# Patient Record
Sex: FEMALE | Race: WHITE | NOT HISPANIC OR LATINO | Employment: OTHER | URBAN - METROPOLITAN AREA
[De-identification: names, ages, dates, MRNs, and addresses within clinical notes are randomized per-mention and may not be internally consistent; named-entity substitution may affect disease eponyms.]

---

## 2017-01-31 ENCOUNTER — TRANSFERRED RECORDS (OUTPATIENT)
Dept: HEALTH INFORMATION MANAGEMENT | Facility: CLINIC | Age: 57
End: 2017-01-31

## 2017-02-03 ENCOUNTER — TRANSFERRED RECORDS (OUTPATIENT)
Dept: HEALTH INFORMATION MANAGEMENT | Facility: CLINIC | Age: 57
End: 2017-02-03

## 2017-05-02 ENCOUNTER — TRANSFERRED RECORDS (OUTPATIENT)
Dept: HEALTH INFORMATION MANAGEMENT | Facility: CLINIC | Age: 57
End: 2017-05-02

## 2017-06-03 ENCOUNTER — HEALTH MAINTENANCE LETTER (OUTPATIENT)
Age: 57
End: 2017-06-03

## 2017-07-19 ENCOUNTER — TRANSFERRED RECORDS (OUTPATIENT)
Dept: HEALTH INFORMATION MANAGEMENT | Facility: CLINIC | Age: 57
End: 2017-07-19

## 2018-01-02 ENCOUNTER — TRANSFERRED RECORDS (OUTPATIENT)
Dept: HEALTH INFORMATION MANAGEMENT | Facility: CLINIC | Age: 58
End: 2018-01-02

## 2018-03-28 ENCOUNTER — TRANSFERRED RECORDS (OUTPATIENT)
Dept: HEALTH INFORMATION MANAGEMENT | Facility: CLINIC | Age: 58
End: 2018-03-28

## 2018-06-20 ENCOUNTER — TRANSFERRED RECORDS (OUTPATIENT)
Dept: HEALTH INFORMATION MANAGEMENT | Facility: CLINIC | Age: 58
End: 2018-06-20

## 2018-09-05 ENCOUNTER — TRANSFERRED RECORDS (OUTPATIENT)
Dept: HEALTH INFORMATION MANAGEMENT | Facility: CLINIC | Age: 58
End: 2018-09-05

## 2019-02-13 ENCOUNTER — TRANSFERRED RECORDS (OUTPATIENT)
Dept: HEALTH INFORMATION MANAGEMENT | Facility: CLINIC | Age: 59
End: 2019-02-13

## 2019-09-29 ENCOUNTER — HEALTH MAINTENANCE LETTER (OUTPATIENT)
Age: 59
End: 2019-09-29

## 2021-01-14 ENCOUNTER — HEALTH MAINTENANCE LETTER (OUTPATIENT)
Age: 61
End: 2021-01-14

## 2021-05-03 ENCOUNTER — TRANSFERRED RECORDS (OUTPATIENT)
Dept: HEALTH INFORMATION MANAGEMENT | Facility: CLINIC | Age: 61
End: 2021-05-03

## 2021-05-11 ENCOUNTER — TRANSFERRED RECORDS (OUTPATIENT)
Dept: HEALTH INFORMATION MANAGEMENT | Facility: CLINIC | Age: 61
End: 2021-05-11

## 2021-08-19 ENCOUNTER — TRANSFERRED RECORDS (OUTPATIENT)
Dept: HEALTH INFORMATION MANAGEMENT | Facility: CLINIC | Age: 61
End: 2021-08-19

## 2021-08-27 ENCOUNTER — TRANSCRIBE ORDERS (OUTPATIENT)
Dept: OTHER | Age: 61
End: 2021-08-27

## 2021-08-27 DIAGNOSIS — C50.919 BREAST CANCER (H): Primary | ICD-10-CM

## 2021-09-12 DIAGNOSIS — Z80.3 FAMILY HISTORY OF BREAST CANCER: Primary | ICD-10-CM

## 2021-10-07 NOTE — PROGRESS NOTES
RECORDS STATUS - BREAST    RECORDS REQUESTED FROM: [facility]/ [facility]    DATE REQUESTED: 11/17/2021   NOTES DETAILS STATUS   OFFICE NOTE from referring provider     OFFICE NOTE from medical oncologist Complete  [facility] Recs are in [facility]    Oncology Visit- 7/7/2016- Malignant neoplasm of left breast (H)     2/5/2014 Breast cancer screening, high risk patient     OFFICE NOTE from surgeon Complete See Breast Biopsies in [facility]   OFFICE NOTE from radiation oncologist     DISCHARGE SUMMARY from hospital Complete 3/30/2016 Left Breast Mass   DISCHARGE REPORT from the ER     OPERATIVE REPORT Complete See Breast Biopsies in EPIC   MEDICATION LIST Complete Epic/CE   CLINICAL TRIAL TREATMENTS TO DATE     LABS     REQUEST BLOCKS FOR ALL BREAST CANCER PTS     PATHOLOGY REPORTS  (Tissue diagnosis, Stage, ER/CT percentage positive and intensity of staining, HER2 IHC, FISH, and all biopsies from breast and any distant metastasis)                 Reports are in Epic/CE Breast Biopsies were done at [facility] Reports are in Epic/CE    6/9/2016    5/3/2016     GENONOMIC TESTING     TYPE:   (Next Generation Sequencing, including Foundation One testing, and Oncotype score)     IMAGING (NEED IMAGES & REPORT)     CT SCANS     MRI     MAMMO Complete CE 3/30/2016   ULTRASOUND Complete 3/23/2016    PET     BONE SCAN     BRAIN MRI       Action    Action Taken 10/7/2021 4:56pm NORRIS     I faxed a request for ALL records to [facility].     I called pt Jazmin - her phone went to .     10/11/2021 7:33AM NORRIS   I faxed records from [facility] to [facility]

## 2021-10-24 ENCOUNTER — HEALTH MAINTENANCE LETTER (OUTPATIENT)
Age: 61
End: 2021-10-24

## 2021-11-09 ENCOUNTER — LAB (OUTPATIENT)
Dept: LAB | Facility: CLINIC | Age: 61
End: 2021-11-09
Payer: COMMERCIAL

## 2021-11-09 DIAGNOSIS — Z00.6 EXAMINATION OF PARTICIPANT IN CLINICAL TRIAL: Primary | ICD-10-CM

## 2021-11-09 DIAGNOSIS — Z80.3 FAMILY HISTORY OF BREAST CANCER: ICD-10-CM

## 2021-11-09 LAB
ALBUMIN SERPL-MCNC: 3.5 G/DL (ref 3.4–5)
ALP SERPL-CCNC: 121 U/L (ref 40–150)
ALT SERPL W P-5'-P-CCNC: 32 U/L (ref 0–50)
ANION GAP SERPL CALCULATED.3IONS-SCNC: 3 MMOL/L (ref 3–14)
AST SERPL W P-5'-P-CCNC: 14 U/L (ref 0–45)
BASOPHILS # BLD AUTO: 0.1 10E3/UL (ref 0–0.2)
BASOPHILS NFR BLD AUTO: 1 %
BILIRUB SERPL-MCNC: 0.9 MG/DL (ref 0.2–1.3)
BUN SERPL-MCNC: 14 MG/DL (ref 7–30)
CALCIUM SERPL-MCNC: 9.2 MG/DL (ref 8.5–10.1)
CANCER AG27-29 SERPL-ACNC: 32 U/ML (ref 0–39)
CEA SERPL-MCNC: 1.1 UG/L (ref 0–2.5)
CHLORIDE BLD-SCNC: 106 MMOL/L (ref 94–109)
CO2 SERPL-SCNC: 33 MMOL/L (ref 20–32)
CREAT SERPL-MCNC: 0.87 MG/DL (ref 0.52–1.04)
EOSINOPHIL # BLD AUTO: 0.3 10E3/UL (ref 0–0.7)
EOSINOPHIL NFR BLD AUTO: 4 %
ERYTHROCYTE [DISTWIDTH] IN BLOOD BY AUTOMATED COUNT: 13.2 % (ref 10–15)
GFR SERPL CREATININE-BSD FRML MDRD: 71 ML/MIN/1.73M2
GLUCOSE BLD-MCNC: 111 MG/DL (ref 70–99)
HCT VFR BLD AUTO: 42.8 % (ref 35–47)
HGB BLD-MCNC: 13.9 G/DL (ref 11.7–15.7)
IMM GRANULOCYTES # BLD: 0 10E3/UL
IMM GRANULOCYTES NFR BLD: 1 %
LYMPHOCYTES # BLD AUTO: 2.1 10E3/UL (ref 0.8–5.3)
LYMPHOCYTES NFR BLD AUTO: 30 %
MCH RBC QN AUTO: 28.5 PG (ref 26.5–33)
MCHC RBC AUTO-ENTMCNC: 32.5 G/DL (ref 31.5–36.5)
MCV RBC AUTO: 88 FL (ref 78–100)
MONOCYTES # BLD AUTO: 0.5 10E3/UL (ref 0–1.3)
MONOCYTES NFR BLD AUTO: 8 %
NEUTROPHILS # BLD AUTO: 4.2 10E3/UL (ref 1.6–8.3)
NEUTROPHILS NFR BLD AUTO: 56 %
NRBC # BLD AUTO: 0 10E3/UL
NRBC BLD AUTO-RTO: 0 /100
PLATELET # BLD AUTO: 254 10E3/UL (ref 150–450)
POTASSIUM BLD-SCNC: 3.3 MMOL/L (ref 3.4–5.3)
PROT SERPL-MCNC: 6.8 G/DL (ref 6.8–8.8)
RBC # BLD AUTO: 4.87 10E6/UL (ref 3.8–5.2)
SODIUM SERPL-SCNC: 142 MMOL/L (ref 133–144)
WBC # BLD AUTO: 7.2 10E3/UL (ref 4–11)

## 2021-11-09 PROCEDURE — 85025 COMPLETE CBC W/AUTO DIFF WBC: CPT

## 2021-11-09 PROCEDURE — 86300 IMMUNOASSAY TUMOR CA 15-3: CPT

## 2021-11-09 PROCEDURE — 36415 COLL VENOUS BLD VENIPUNCTURE: CPT

## 2021-11-09 PROCEDURE — 80053 COMPREHEN METABOLIC PANEL: CPT

## 2021-11-09 PROCEDURE — 82378 CARCINOEMBRYONIC ANTIGEN: CPT

## 2021-11-17 ENCOUNTER — VIRTUAL VISIT (OUTPATIENT)
Dept: ONCOLOGY | Facility: CLINIC | Age: 61
End: 2021-11-17
Attending: INTERNAL MEDICINE
Payer: COMMERCIAL

## 2021-11-17 ENCOUNTER — PRE VISIT (OUTPATIENT)
Dept: ONCOLOGY | Facility: CLINIC | Age: 61
End: 2021-11-17

## 2021-11-17 DIAGNOSIS — Z80.3 FAMILY HISTORY OF BREAST CANCER: Primary | ICD-10-CM

## 2021-11-17 DIAGNOSIS — Z17.0 MALIGNANT NEOPLASM OF LOWER-OUTER QUADRANT OF RIGHT BREAST OF FEMALE, ESTROGEN RECEPTOR POSITIVE (H): ICD-10-CM

## 2021-11-17 DIAGNOSIS — E55.9 VITAMIN D DEFICIENCY: ICD-10-CM

## 2021-11-17 DIAGNOSIS — C50.511 MALIGNANT NEOPLASM OF LOWER-OUTER QUADRANT OF RIGHT BREAST OF FEMALE, ESTROGEN RECEPTOR POSITIVE (H): ICD-10-CM

## 2021-11-17 PROCEDURE — 99205 OFFICE O/P NEW HI 60 MIN: CPT | Mod: 95 | Performed by: INTERNAL MEDICINE

## 2021-11-17 NOTE — PROGRESS NOTES
Jazmin is a 61 year old who is being evaluated via a billable video visit.  Patient reviewed medications and allergies via Sencha e-check in.       How would you like to obtain your AVS? Sencha  If the video visit is dropped, the invitation should be resent by: Text to cell phone: [phone]  Will anyone else be joining your video visit? No

## 2021-11-17 NOTE — LETTER
11/17/2021         RE: Jazmin Soler  [address] [place] [place] [id number]        Dear Colleague,    Thank you for referring your patient, Jazmin Soler, to the [facility] [place]. Please see a copy of my visit note below.    Jazmin is a 61 year old who is being evaluated via a billable video visit.  Patient reviewed medications and allergies via Rule. e-check in.       How would you like to obtain your AVS? Rule.  If the video visit is dropped, the invitation should be resent by: Text to cell phone: [phone]  Will anyone else be joining your video visit? No          [facility]    Hematology/Oncology New Patient Note video visit      Today's Date: 11/17/21    Reason for Consult: Breast cancer, here to establish care      HISTORY OF PRESENT ILLNESS: Jazmin is a very pleasant 61-year-old patient of mine whom I been seeing for years.  She is transferring care from [place] [facility]      1.  Diagnosed with breast cancer T2 invasive lobular carcinoma and invasive ductal carcinoma in May 2016  2.  Oncotype DX low risk of recurrence  3. Mastectomies final tumor was ER positive IL positive HER-2/philippe negative with evidence of lymphovascular invasion 1 out of 2 sentinel lymph nodes were positive for carcinoma measuring 3 mm in size  4.  Total tumor size 4 cm, Oncotype DX score was 13  5.  BRCA testing negative  6.  Letrozole June 2016 to November 2020 changed to Aromasin due to arthralgias  7.  Bone density scan recently shows a T score of -1.2 baseline initially prior to starting was normal    Interval history Jazmin overall is doing well.  She states the Aromasin she tolerates much better than the letrozole.  Her feet continue to her to do dehydrated.  She is drinking 1-1/2 cups of milk daily.  She takes vitamin D 6000 IUs daily.  In addition to that she is taking Osteo Bi-Flex 20 which is 1000 IUs of D3.  She also is taking calcium 1200 mg a day which she started  recently about a week ago.  She cannot walk due to pain in her feet which is chronic prior to starting the aromatase inhibitor therapy.  We discussed the bone density scan in detail which shows a T score of -1.2 with mild osteopenia.  Patient has dental work she needs to complete.  She is able to now that she will do in January currently she cannot afford it therefore the Zometa has not been started.    REVIEW OF SYSTEMS:   14 point ROS was reviewed and is negative other than as noted above in HPI.       HOME MEDICATIONS:  Current Outpatient Medications   Medication Sig Dispense Refill     acetaminophen (TYLENOL) 325 MG tablet Take 2 tablets (650 mg) by mouth every 4 hours as needed for other (mild pain) 100 tablet 0     DULoxetine HCl (CYMBALTA PO) Take 30 mg by mouth daily       ibuprofen (ADVIL,MOTRIN) 600 MG tablet Take 1 tablet (600 mg) by mouth every 6 hours as needed for pain (mild) 30 tablet 0     LORazepam (ATIVAN) 0.5 MG tablet Take 2 tablets (1 mg) by mouth every 8 hours as needed for anxiety 20 tablet 0     nitrofurantoin, macrocrystal-monohydrate, (MACROBID) 100 MG capsule Take 100 mg by mouth as needed       oxycodone (OXYCONTIN) 10 MG 12 hr tablet Take 10 mg by mouth 3 times daily.       oxyCODONE HCl (OXECTA) 5 MG TABA Take 5 mg by mouth as needed       Polyethylene Glycol 3350 (MIRALAX PO) Take by mouth as needed       rizatriptan (MAXALT) 10 MG tablet Take 10 mg by mouth at onset of headache. May repeat in 2 hours if needed: max 2/day; average number of headaches monthly 1       triamterene-hydrochlorothiazide (MAXZIDE-25) 37.5-25 MG per tablet Take 1 tablet by mouth daily.       VITAMIN D, CHOLECALCIFEROL, PO Take  by mouth.           ALLERGIES:  Allergies   Allergen Reactions     Flu Virus Vaccine Other (See Comments)     Guillain [place] syndrome         PAST MEDICAL HISTORY:  Past Medical History:   Diagnosis Date     Anxiety      Breast cancer (H) 3/30/2016    Left breast     H/O Guillain-[place]athrop  syndrome      Hypertension      Migraines      Peripheral neuropathy     Related to h/o Guillain-[place] syndrome     S/P radiation therapy     6,040 cGy to left chest wall_axilla and 4,500 cGy to left SCV_axilla completed on 8/12/2016 [facility]         PAST SURGICAL HISTORY:  Past Surgical History:   Procedure Laterality Date     BILATERAL 1ST STAGE BREAST RECONSTRUCTION Bilateral 5/3/2016    Dr. Marry Aldridge     BIOPSY BREAST SEED LOCALIZATION Left 3/30/2016    Procedure: BIOPSY BREAST SEED LOCALIZATION;  Surgeon: Yue Smiley MD;  Location: [facility]     BREAST BIOPSY, CORE RT/LT Left 9/2/2015     BREAST BIOPSY, CORE RT/LT Left 3/23/2016     CHOLECYSTECTOMY  1998     MAMMOPLASTY REDUCTION Bilateral 1989     MASTECTOMY SIMPLE BILATERAL Bilateral 5/3/2016    Dr. Smiley [facility]     OTHER - AXILLARY SENTINEL LYMPH NODE BIOPSY Left 5/3/2016     [facility]     TUBAL LIGATION  1998         SOCIAL HISTORY:  Social History     Socioeconomic History     Marital status:      Spouse name: Not on file     Number of children: Not on file     Years of education: Not on file     Highest education level: Not on file   Occupational History     Not on file   Tobacco Use     Smoking status: Never Smoker     Smokeless tobacco: Never Used   Substance and Sexual Activity     Alcohol use: No     Alcohol/week: 0.0 standard drinks     Drug use: No     Sexual activity: Yes     Partners: Male     Birth control/protection: Female Surgical     Comment: Tubal ligation 1998   Other Topics Concern     Parent/sibling w/ CABG, MI or angioplasty before 65F 55M? Not Asked   Social History Narrative     Not on file     Social Determinants of Health     Financial Resource Strain: Not on file   Food Insecurity: Not on file   Transportation Needs: Not on file   Physical Activity: Not on file   Stress: Not on file   Social Connections: Not on file   Intimate Partner Violence: Not on file    Housing Stability: Not on file         FAMILY HISTORY:  Family History   Problem Relation Age of Onset     Cancer Mother 65        Breast, living     Cancer Father 55        Melanoma, living     Cardiovascular Maternal Grandmother         Heart attack     Cancer Maternal Grandfather 71        Gallbladder,      Unknown/Adopted Paternal Grandmother         old age      Cardiovascular Paternal Grandfather         heart attack     Cancer Brother 40        Testicular, living     Cancer Other         Breast,      Cancer Maternal Aunt 55        Breast, living (mother's twin sister)     Cancer Maternal Aunt 60        Breast, living     Cancer Other 55        Breast, living     Cancer Other         Breast,      Cancer Other         Breast,  (Daughter to above maternal great aunt)     Cancer Other         Breast,  (Daughter to above maternal great aunt)         PHYSICAL EXAM:  Vital signs:  LMP 2009    ECO  GENERAL/CONSTITUTIONAL: No acute distress.        LABS:  CBC RESULTS:   Recent Labs   Lab Test 21  1350   WBC 7.2   RBC 4.87   HGB 13.9   HCT 42.8   MCV 88   MCH 28.5   MCHC 32.5   RDW 13.2          Recent Labs   Lab Test 21  1350 16  0000     --    POTASSIUM 3.3* 3.8   CHLORIDE 106  --    CO2 33*  --    ANIONGAP 3  --    * 90   BUN 14  --    CR 0.87 1.14   DONG 9.2  --    Labs reviewed in detail with the patient and the only abnormality is a potassium level of 3.3      PATHOLOGY:    As per HPI  IMAGING:  None    ASSESSMENT/PLAN:  Jazmin is a very pleasant 61-year-old female who has a history of invasive lobular carcinoma stage IIb ER/NE positive HER-2/philippe negative currently on Aromasin    I discussed with the patient that in her situation being that she is lymph node positive and a lobular cancer with lymphovascular invasion my recommendation would be to continue 10 years of therapy therefore she will complete in .  We will go  ahead and plan on doing another bone density in May 2023    Since her T-scores itself -1.2 I think we do have time to consider Zometa especially in the setting of her recent dental work which needed to complete prior to initiating Zometa.    We also discussed her hypokalemia which is due to her diuretics.  I have asked her to eat a banana or glass of orange juice every day with her potassium.    In regards to calcium and vitamin D I have asked her to decrease her vitamin D to 2000 IUs of D3 daily intake to Osteo Bi-Flex.  The Osteo Bi-Flex is not helping after she completes it with arthralgias she can certainly stop.  We also discussed the role of weightbearing exercises for bone density that she should consider weightlifting at least 2-3 times a week    1.  Breast cancer: Terms.  2.  Due in 2023.  Mild osteopenia  3.  Dental work: Continue as per her dentist.  She will likely do a root canal next year.  4.  Bone health: Calcium 1200 mg a day, she can consider Viactiv she is at the calcium causing constipation.  Vitamin D 2000 IUs daily plus Osteo Bi-Flex.    5.  Guillain-Barré syndrome: Chronic foot pain continue management per primary care physician    6.  Check vitamin D level with the next visit    See me back in 6 months with labs prior.  All questions answered to satisfaction.  Total time spent 60 minutes 50 minutes with the patient discussing the findings and plan above.  10 minutes electronically documenting clinical information entering orders          Parker Richardson MD  Hematology/Oncology  [facility]      Again, thank you for allowing me to participate in the care of your patient.        Sincerely,        Parker Richardson MD

## 2021-11-17 NOTE — PROGRESS NOTES
[facility]    Hematology/Oncology New Patient Note video visit      Today's Date: 11/17/21    Reason for Consult: Breast cancer, here to establish care      HISTORY OF PRESENT ILLNESS: Jazmin is a very pleasant 61-year-old patient of mine whom I been seeing for years.  She is transferring care from [place] [facility]      1.  Diagnosed with breast cancer T2 invasive lobular carcinoma and invasive ductal carcinoma in May 2016  2.  Oncotype DX low risk of recurrence  3. Mastectomies final tumor was ER positive [place] positive HER-2/philippe negative with evidence of lymphovascular invasion 1 out of 2 sentinel lymph nodes were positive for carcinoma measuring 3 mm in size  4.  Total tumor size 4 cm, Oncotype DX score was 13  5.  BRCA testing negative  6.  Letrozole June 2016 to November 2020 changed to Aromasin due to arthralgias  7.  Bone density scan recently shows a T score of -1.2 baseline initially prior to starting was normal    Interval history Jazmin overall is doing well.  She states the Aromasin she tolerates much better than the letrozole.  Her feet continue to her to do dehydrated.  She is drinking 1-1/2 cups of milk daily.  She takes vitamin D 6000 IUs daily.  In addition to that she is taking Osteo Bi-Flex 20 which is 1000 IUs of D3.  She also is taking calcium 1200 mg a day which she started recently about a week ago.  She cannot walk due to pain in her feet which is chronic prior to starting the aromatase inhibitor therapy.  We discussed the bone density scan in detail which shows a T score of -1.2 with mild osteopenia.  Patient has dental work she needs to complete.  She is able to now that she will do in January currently she cannot afford it therefore the Zometa has not been started.    REVIEW OF SYSTEMS:   14 point ROS was reviewed and is negative other than as noted above in HPI.       HOME MEDICATIONS:  Current Outpatient Medications   Medication Sig Dispense Refill      acetaminophen (TYLENOL) 325 MG tablet Take 2 tablets (650 mg) by mouth every 4 hours as needed for other (mild pain) 100 tablet 0     DULoxetine HCl (CYMBALTA PO) Take 30 mg by mouth daily       ibuprofen (ADVIL,MOTRIN) 600 MG tablet Take 1 tablet (600 mg) by mouth every 6 hours as needed for pain (mild) 30 tablet 0     LORazepam (ATIVAN) 0.5 MG tablet Take 2 tablets (1 mg) by mouth every 8 hours as needed for anxiety 20 tablet 0     nitrofurantoin, macrocrystal-monohydrate, (MACROBID) 100 MG capsule Take 100 mg by mouth as needed       oxycodone (OXYCONTIN) 10 MG 12 hr tablet Take 10 mg by mouth 3 times daily.       oxyCODONE HCl (OXECTA) 5 MG TABA Take 5 mg by mouth as needed       Polyethylene Glycol 3350 (MIRALAX PO) Take by mouth as needed       rizatriptan (MAXALT) 10 MG tablet Take 10 mg by mouth at onset of headache. May repeat in 2 hours if needed: max 2/day; average number of headaches monthly 1       triamterene-hydrochlorothiazide (MAXZIDE-25) 37.5-25 MG per tablet Take 1 tablet by mouth daily.       VITAMIN D, CHOLECALCIFEROL, PO Take  by mouth.           ALLERGIES:  Allergies   Allergen Reactions     Flu Virus Vaccine Other (See Comments)     Guillain [place] syndrome         PAST MEDICAL HISTORY:  Past Medical History:   Diagnosis Date     Anxiety      Breast cancer (H) 3/30/2016    Left breast     H/O Guillain-[place] syndrome      Hypertension      Migraines      Peripheral neuropathy     Related to h/o Guillain-[place] syndrome     S/P radiation therapy     6,040 cGy to left chest wall_axilla and 4,500 cGy to left SCV_axilla completed on 8/12/2016 [facility]         PAST SURGICAL HISTORY:  Past Surgical History:   Procedure Laterality Date     BILATERAL 1ST STAGE BREAST RECONSTRUCTION Bilateral 5/3/2016    Dr. Marry Aldridge     BIOPSY BREAST SEED LOCALIZATION Left 3/30/2016    Procedure: BIOPSY BREAST SEED LOCALIZATION;  Surgeon: Yue Smiley MD;  Location: [facility]     BREAST BIOPSY,  CORE RT/LT Left 2015     BREAST BIOPSY, CORE RT/LT Left 3/23/2016     CHOLECYSTECTOMY  1998     MAMMOPLASTY REDUCTION Bilateral 1989     MASTECTOMY SIMPLE BILATERAL Bilateral 5/3/2016     [facility]     OTHER - AXILLARY SENTINEL LYMPH NODE BIOPSY Left 5/3/2016     [facility]     TUBAL LIGATION           SOCIAL HISTORY:  Social History     Socioeconomic History     Marital status:      Spouse name: Not on file     Number of children: Not on file     Years of education: Not on file     Highest education level: Not on file   Occupational History     Not on file   Tobacco Use     Smoking status: Never Smoker     Smokeless tobacco: Never Used   Substance and Sexual Activity     Alcohol use: No     Alcohol/week: 0.0 standard drinks     Drug use: No     Sexual activity: Yes     Partners: Male     Birth control/protection: Female Surgical     Comment: Tubal ligation    Other Topics Concern     Parent/sibling w/ CABG, MI or angioplasty before 65F 55M? Not Asked   Social History Narrative     Not on file     Social Determinants of Health     Financial Resource Strain: Not on file   Food Insecurity: Not on file   Transportation Needs: Not on file   Physical Activity: Not on file   Stress: Not on file   Social Connections: Not on file   Intimate Partner Violence: Not on file   Housing Stability: Not on file         FAMILY HISTORY:  Family History   Problem Relation Age of Onset     Cancer Mother 65        Breast, living     Cancer Father 55        Melanoma, living     Cardiovascular Maternal Grandmother         Heart attack     Cancer Maternal Grandfather 71        Gallbladder,      Unknown/Adopted Paternal Grandmother         old age      Cardiovascular Paternal Grandfather         heart attack     Cancer Brother 40        Testicular, living     Cancer Other         Breast,      Cancer Maternal Aunt 55        Breast, living (mother's twin sister)      Cancer Maternal Aunt 60        Breast, living     Cancer Other 55        Breast, living     Cancer Other         Breast,      Cancer Other         Breast,  (Daughter to above maternal great aunt)     Cancer Other         Breast,  (Daughter to above maternal great aunt)         PHYSICAL EXAM:  Vital signs:  LMP 2009    ECO  GENERAL/CONSTITUTIONAL: No acute distress.        LABS:  CBC RESULTS:   Recent Labs   Lab Test 21  1350   WBC 7.2   RBC 4.87   HGB 13.9   HCT 42.8   MCV 88   MCH 28.5   MCHC 32.5   RDW 13.2          Recent Labs   Lab Test 21  1350 16  0000     --    POTASSIUM 3.3* 3.8   CHLORIDE 106  --    CO2 33*  --    ANIONGAP 3  --    * 90   BUN 14  --    CR 0.87 1.14   DONG 9.2  --    Labs reviewed in detail with the patient and the only abnormality is a potassium level of 3.3      PATHOLOGY:    As per HPI  IMAGING:  None    ASSESSMENT/PLAN:  Jazmin is a very pleasant 61-year-old female who has a history of invasive lobular carcinoma stage IIb ER/HI positive HER-2/philippe negative currently on Aromasin    I discussed with the patient that in her situation being that she is lymph node positive and a lobular cancer with lymphovascular invasion my recommendation would be to continue 10 years of therapy therefore she will complete in .  We will go ahead and plan on doing another bone density in May 2023    Since her T-scores itself -1.2 I think we do have time to consider Zometa especially in the setting of her recent dental work which needed to complete prior to initiating Zometa.    We also discussed her hypokalemia which is due to her diuretics.  I have asked her to eat a banana or glass of orange juice every day with her potassium.    In regards to calcium and vitamin D I have asked her to decrease her vitamin D to 2000 IUs of D3 daily intake to Osteo Bi-Flex.  The Osteo Bi-Flex is not helping after she completes it with arthralgias  she can certainly stop.  We also discussed the role of weightbearing exercises for bone density that she should consider weightlifting at least 2-3 times a week    1.  Breast cancer: Terms.  2.  Due in 2023.  Mild osteopenia  3.  Dental work: Continue as per her dentist.  She will likely do a root canal next year.  4.  Bone health: Calcium 1200 mg a day, she can consider Viactiv she is at the calcium causing constipation.  Vitamin D 2000 IUs daily plus Osteo Bi-Flex.    5.  Guillain-Barré syndrome: Chronic foot pain continue management per primary care physician    6.  Check vitamin D level with the next visit    See me back in 6 months with labs prior.  All questions answered to satisfaction.  Total time spent 60 minutes 50 minutes with the patient discussing the findings and plan above.  10 minutes electronically documenting clinical information entering orders          Parker Richardson MD  Hematology/Oncology  [facility]

## 2021-11-17 NOTE — LETTER
11/17/2021         RE: Jazmin Soler  [address] [place] [facility] [address]        Dear Colleague,    Thank you for referring your patient, Jazmin Soler, to the [facility] [place]. Please see a copy of my visit note below.    Jazmin is a 61 year old who is being evaluated via a billable video visit.  Patient reviewed medications and allergies via [employer] e-check in.       How would you like to obtain your AVS? [employer]  If the video visit is dropped, the invitation should be resent by: Text to cell phone: [phone]  Will anyone else be joining your video visit? No          [facility]    Hematology/Oncology New Patient Note video visit      Today's Date: 11/17/21    Reason for Consult: Breast cancer, here to establish care      HISTORY OF PRESENT ILLNESS: Jazmin is a very pleasant 61-year-old patient of mine whom I been seeing for years.  She is transferring care from [place] oncology      1.  Diagnosed with breast cancer T2 invasive lobular carcinoma and invasive ductal carcinoma in May 2016  2.  Oncotype DX low risk of recurrence  3. Mastectomies final tumor was ER positive ND positive HER-2/philippe negative with evidence of lymphovascular invasion 1 out of 2 sentinel lymph nodes were positive for carcinoma measuring 3 mm in size  4.  Total tumor size 4 cm, Oncotype DX score was 13  5.  BRCA testing negative  6.  Letrozole June 2016 to November 2020 changed to Aromasin due to arthralgias  7.  Bone density scan recently shows a T score of -1.2 baseline initially prior to starting was normal    Interval history Jazmin overall is doing well.  She states the Aromasin she tolerates much better than the letrozole.  Her feet continue to her to do dehydrated.  She is drinking 1-1/2 cups of milk daily.  She takes vitamin D 6000 IUs daily.  In addition to that she is taking Osteo Bi-Flex 20 which is 1000 IUs of D3.  She also is taking calcium 1200 mg a day which she started  recently about a week ago.  She cannot walk due to pain in her feet which is chronic prior to starting the aromatase inhibitor therapy.  We discussed the bone density scan in detail which shows a T score of -1.2 with mild osteopenia.  Patient has dental work she needs to complete.  She is able to now that she will do in January currently she cannot afford it therefore the Zometa has not been started.    REVIEW OF SYSTEMS:   14 point ROS was reviewed and is negative other than as noted above in HPI.       HOME MEDICATIONS:  Current Outpatient Medications   Medication Sig Dispense Refill     acetaminophen (TYLENOL) 325 MG tablet Take 2 tablets (650 mg) by mouth every 4 hours as needed for other (mild pain) 100 tablet 0     DULoxetine HCl (CYMBALTA PO) Take 30 mg by mouth daily       ibuprofen (ADVIL,MOTRIN) 600 MG tablet Take 1 tablet (600 mg) by mouth every 6 hours as needed for pain (mild) 30 tablet 0     LORazepam (ATIVAN) 0.5 MG tablet Take 2 tablets (1 mg) by mouth every 8 hours as needed for anxiety 20 tablet 0     nitrofurantoin, macrocrystal-monohydrate, (MACROBID) 100 MG capsule Take 100 mg by mouth as needed       oxycodone (OXYCONTIN) 10 MG 12 hr tablet Take 10 mg by mouth 3 times daily.       oxyCODONE HCl (OXECTA) 5 MG TABA Take 5 mg by mouth as needed       Polyethylene Glycol 3350 (MIRALAX PO) Take by mouth as needed       rizatriptan (MAXALT) 10 MG tablet Take 10 mg by mouth at onset of headache. May repeat in 2 hours if needed: max 2/day; average number of headaches monthly 1       triamterene-hydrochlorothiazide (MAXZIDE-25) 37.5-25 MG per tablet Take 1 tablet by mouth daily.       VITAMIN D, CHOLECALCIFEROL, PO Take  by mouth.           ALLERGIES:  Allergies   Allergen Reactions     Flu Virus Vaccine Other (See Comments)     Guillain Austin syndrome         PAST MEDICAL HISTORY:  Past Medical History:   Diagnosis Date     Anxiety      Breast cancer (H) 3/30/2016    Left breast     H/O Guillain-Austin  syndrome      Hypertension      Migraines      Peripheral neuropathy     Related to h/o Guillain-[place] syndrome     S/P radiation therapy     6,040 cGy to left chest wall_axilla and 4,500 cGy to left SCV_axilla completed on 8/12/[facility]         PAST SURGICAL HISTORY:  Past Surgical History:   Procedure Laterality Date     BILATERAL 1ST STAGE BREAST RECONSTRUCTION Bilateral 5/3/2016    Dr. Marry Aldridge     BIOPSY BREAST SEED LOCALIZATION Left 3/30/2016    Procedure: BIOPSY BREAST SEED LOCALIZATION;  Surgeon: Yue Smiley MD;  Location: [facility]     BREAST BIOPSY, CORE RT/LT Left 9/2/2015     BREAST BIOPSY, CORE RT/LT Left 3/23/2016     CHOLECYSTECTOMY  1998     MAMMOPLASTY REDUCTION Bilateral 1989     MASTECTOMY SIMPLE BILATERAL Bilateral 5/3/2016    Dr. Smiley [facility]     OTHER - AXILLARY SENTINEL LYMPH NODE BIOPSY Left 5/3/2016     [facility]     TUBAL LIGATION  1998         SOCIAL HISTORY:  Social History     Socioeconomic History     Marital status:      Spouse name: Not on file     Number of children: Not on file     Years of education: Not on file     Highest education level: Not on file   Occupational History     Not on file   Tobacco Use     Smoking status: Never Smoker     Smokeless tobacco: Never Used   Substance and Sexual Activity     Alcohol use: No     Alcohol/week: 0.0 standard drinks     Drug use: No     Sexual activity: Yes     Partners: Male     Birth control/protection: Female Surgical     Comment: Tubal ligation 1998   Other Topics Concern     Parent/sibling w/ CABG, MI or angioplasty before 65F 55M? Not Asked   Social History Narrative     Not on file     Social Determinants of Health     Financial Resource Strain: Not on file   Food Insecurity: Not on file   Transportation Needs: Not on file   Physical Activity: Not on file   Stress: Not on file   Social Connections: Not on file   Intimate Partner Violence: Not on file    Housing Stability: Not on file         FAMILY HISTORY:  Family History   Problem Relation Age of Onset     Cancer Mother 65        Breast, living     Cancer Father 55        Melanoma, living     Cardiovascular Maternal Grandmother         Heart attack     Cancer Maternal Grandfather 71        Gallbladder,      Unknown/Adopted Paternal Grandmother         old age      Cardiovascular Paternal Grandfather         heart attack     Cancer Brother 40        Testicular, living     Cancer Other         Breast,      Cancer Maternal Aunt 55        Breast, living (mother's twin sister)     Cancer Maternal Aunt 60        Breast, living     Cancer Other 55        Breast, living     Cancer Other         Breast,      Cancer Other         Breast,  (Daughter to above maternal great aunt)     Cancer Other         Breast,  (Daughter to above maternal great aunt)         PHYSICAL EXAM:  Vital signs:  LMP 2009    ECO  GENERAL/CONSTITUTIONAL: No acute distress.        LABS:  CBC RESULTS:   Recent Labs   Lab Test 21  1350   WBC 7.2   RBC 4.87   HGB 13.9   HCT 42.8   MCV 88   MCH 28.5   MCHC 32.5   RDW 13.2          Recent Labs   Lab Test 21  1350 16  0000     --    POTASSIUM 3.3* 3.8   CHLORIDE 106  --    CO2 33*  --    ANIONGAP 3  --    * 90   BUN 14  --    CR 0.87 1.14   DONG 9.2  --    Labs reviewed in detail with the patient and the only abnormality is a potassium level of 3.3      PATHOLOGY:    As per HPI  IMAGING:  None    ASSESSMENT/PLAN:  Jazmin is a very pleasant 61-year-old female who has a history of invasive lobular carcinoma stage IIb ER/TX positive HER-2/philippe negative currently on Aromasin    I discussed with the patient that in her situation being that she is lymph node positive and a lobular cancer with lymphovascular invasion my recommendation would be to continue 10 years of therapy therefore she will complete in .  We will go  ahead and plan on doing another bone density in May 2023    Since her T-scores itself -1.2 I think we do have time to consider Zometa especially in the setting of her recent dental work which needed to complete prior to initiating Zometa.    We also discussed her hypokalemia which is due to her diuretics.  I have asked her to eat a banana or glass of orange juice every day with her potassium.    In regards to calcium and vitamin D I have asked her to decrease her vitamin D to 2000 IUs of D3 daily intake to Osteo Bi-Flex.  The Osteo Bi-Flex is not helping after she completes it with arthralgias she can certainly stop.  We also discussed the role of weightbearing exercises for bone density that she should consider weightlifting at least 2-3 times a week    1.  Breast cancer: Terms.  2.  Due in 2023.  Mild osteopenia  3.  Dental work: Continue as per her dentist.  She will likely do a root canal next year.  4.  Bone health: Calcium 1200 mg a day, she can consider Viactiv she is at the calcium causing constipation.  Vitamin D 2000 IUs daily plus Osteo Bi-Flex.    5.  Guillain-Barré syndrome: Chronic foot pain continue management per primary care physician    6.  Check vitamin D level with the next visit    See me back in 6 months with labs prior.  All questions answered to satisfaction.  Total time spent 60 minutes 50 minutes with the patient discussing the findings and plan above.  10 minutes electronically documenting clinical information entering orders          Parker Richardson MD  Hematology/Oncology  [facility]      Again, thank you for allowing me to participate in the care of your patient.        Sincerely,        Parker Richardson MD

## 2022-02-13 ENCOUNTER — HEALTH MAINTENANCE LETTER (OUTPATIENT)
Age: 62
End: 2022-02-13

## 2022-04-11 DIAGNOSIS — Z80.3 FAMILY HISTORY OF BREAST CANCER: Primary | ICD-10-CM

## 2022-04-11 NOTE — TELEPHONE ENCOUNTER
Patient is requesting a refill on Potassium. Per patient, was previously prescribed at a different Oncology Center and this will be her first refill with us. She is currently out of medication. Pharmacy gave her a two day wai refill.    Routing to provider for review and approval.    Sharon Caban RN, BSN, PHN  M.[facility]

## 2022-04-12 RX ORDER — POTASSIUM CHLORIDE 1500 MG/1
20 TABLET, EXTENDED RELEASE ORAL 2 TIMES DAILY
Qty: 180 TABLET | Refills: 1 | Status: SHIPPED | OUTPATIENT
Start: 2022-04-12

## 2022-04-12 NOTE — TELEPHONE ENCOUNTER
Patient is calling to check on status of refill. She is running out of Potassium.     Routing to care team for review and approval.    Sharon Caban RN, BSN, PHN  M.[facility]

## 2022-05-17 ENCOUNTER — LAB (OUTPATIENT)
Dept: LAB | Facility: CLINIC | Age: 62
End: 2022-05-17
Payer: COMMERCIAL

## 2022-05-17 DIAGNOSIS — C50.511 MALIGNANT NEOPLASM OF LOWER-OUTER QUADRANT OF RIGHT BREAST OF FEMALE, ESTROGEN RECEPTOR POSITIVE (H): ICD-10-CM

## 2022-05-17 DIAGNOSIS — Z80.3 FAMILY HISTORY OF BREAST CANCER: ICD-10-CM

## 2022-05-17 DIAGNOSIS — Z17.0 MALIGNANT NEOPLASM OF LOWER-OUTER QUADRANT OF RIGHT BREAST OF FEMALE, ESTROGEN RECEPTOR POSITIVE (H): ICD-10-CM

## 2022-05-17 DIAGNOSIS — E55.9 VITAMIN D DEFICIENCY: ICD-10-CM

## 2022-05-17 LAB
ALBUMIN SERPL-MCNC: 3.6 G/DL (ref 3.4–5)
ALP SERPL-CCNC: 97 U/L (ref 40–150)
ALT SERPL W P-5'-P-CCNC: 51 U/L (ref 0–50)
ANION GAP SERPL CALCULATED.3IONS-SCNC: 6 MMOL/L (ref 3–14)
AST SERPL W P-5'-P-CCNC: 24 U/L (ref 0–45)
BASOPHILS # BLD AUTO: 0.1 10E3/UL (ref 0–0.2)
BASOPHILS NFR BLD AUTO: 1 %
BILIRUB SERPL-MCNC: 1.1 MG/DL (ref 0.2–1.3)
BUN SERPL-MCNC: 14 MG/DL (ref 7–30)
CALCIUM SERPL-MCNC: 9.4 MG/DL (ref 8.5–10.1)
CEA SERPL-MCNC: 0.9 UG/L (ref 0–2.5)
CHLORIDE BLD-SCNC: 106 MMOL/L (ref 94–109)
CO2 SERPL-SCNC: 29 MMOL/L (ref 20–32)
CREAT SERPL-MCNC: 0.9 MG/DL (ref 0.52–1.04)
EOSINOPHIL # BLD AUTO: 0.2 10E3/UL (ref 0–0.7)
EOSINOPHIL NFR BLD AUTO: 3 %
ERYTHROCYTE [DISTWIDTH] IN BLOOD BY AUTOMATED COUNT: 13.3 % (ref 10–15)
GFR SERPL CREATININE-BSD FRML MDRD: 71 ML/MIN/1.73M2
GLUCOSE BLD-MCNC: 135 MG/DL (ref 70–99)
HCT VFR BLD AUTO: 42.1 % (ref 35–47)
HGB BLD-MCNC: 14.1 G/DL (ref 11.7–15.7)
IMM GRANULOCYTES # BLD: 0 10E3/UL
IMM GRANULOCYTES NFR BLD: 0 %
LYMPHOCYTES # BLD AUTO: 1.8 10E3/UL (ref 0.8–5.3)
LYMPHOCYTES NFR BLD AUTO: 30 %
MCH RBC QN AUTO: 29.1 PG (ref 26.5–33)
MCHC RBC AUTO-ENTMCNC: 33.5 G/DL (ref 31.5–36.5)
MCV RBC AUTO: 87 FL (ref 78–100)
MONOCYTES # BLD AUTO: 0.5 10E3/UL (ref 0–1.3)
MONOCYTES NFR BLD AUTO: 8 %
NEUTROPHILS # BLD AUTO: 3.6 10E3/UL (ref 1.6–8.3)
NEUTROPHILS NFR BLD AUTO: 58 %
NRBC # BLD AUTO: 0 10E3/UL
NRBC BLD AUTO-RTO: 0 /100
PLATELET # BLD AUTO: 242 10E3/UL (ref 150–450)
POTASSIUM BLD-SCNC: 3.6 MMOL/L (ref 3.4–5.3)
PROT SERPL-MCNC: 6.7 G/DL (ref 6.8–8.8)
RBC # BLD AUTO: 4.85 10E6/UL (ref 3.8–5.2)
SODIUM SERPL-SCNC: 141 MMOL/L (ref 133–144)
WBC # BLD AUTO: 6.2 10E3/UL (ref 4–11)

## 2022-05-17 PROCEDURE — 82378 CARCINOEMBRYONIC ANTIGEN: CPT

## 2022-05-17 PROCEDURE — 85025 COMPLETE CBC W/AUTO DIFF WBC: CPT

## 2022-05-17 PROCEDURE — 86300 IMMUNOASSAY TUMOR CA 15-3: CPT

## 2022-05-17 PROCEDURE — 36415 COLL VENOUS BLD VENIPUNCTURE: CPT

## 2022-05-17 PROCEDURE — 80053 COMPREHEN METABOLIC PANEL: CPT

## 2022-05-17 PROCEDURE — 82306 VITAMIN D 25 HYDROXY: CPT

## 2022-05-18 LAB
CANCER AG27-29 SERPL-ACNC: 40 U/ML (ref 0–39)
DEPRECATED CALCIDIOL+CALCIFEROL SERPL-MC: 65 UG/L (ref 20–75)

## 2022-05-19 DIAGNOSIS — C50.919 BREAST CANCER (H): Primary | ICD-10-CM

## 2022-05-19 RX ORDER — EXEMESTANE 25 MG/1
25 TABLET ORAL DAILY
Qty: 90 TABLET | Refills: 3 | Status: SHIPPED | OUTPATIENT
Start: 2022-05-19 | End: 2023-06-13

## 2022-05-27 ENCOUNTER — VIRTUAL VISIT (OUTPATIENT)
Dept: ONCOLOGY | Facility: CLINIC | Age: 62
End: 2022-05-27
Attending: INTERNAL MEDICINE
Payer: COMMERCIAL

## 2022-05-27 DIAGNOSIS — Z17.0 MALIGNANT NEOPLASM OF NIPPLE OF RIGHT BREAST IN FEMALE, ESTROGEN RECEPTOR POSITIVE (H): Primary | ICD-10-CM

## 2022-05-27 DIAGNOSIS — C50.011 MALIGNANT NEOPLASM OF NIPPLE OF RIGHT BREAST IN FEMALE, ESTROGEN RECEPTOR POSITIVE (H): Primary | ICD-10-CM

## 2022-05-27 PROCEDURE — 99214 OFFICE O/P EST MOD 30 MIN: CPT | Mod: 95 | Performed by: INTERNAL MEDICINE

## 2022-05-27 NOTE — PROGRESS NOTES
Jazmin is a 62 year old who is being evaluated via a billable video visit.  Currently in state of [place].    How would you like to obtain your AVS? MyChart  If the video visit is dropped, the invitation should be resent by: Text to cell phone: [phone]  Will anyone else be joining your video visit? MARIBELL Rico    Video Start Time: 10:05 AM  Video-Visit Details    Type of service:  Video Visit    Video End Time:10:05 AM    Originating Location (pt. Location): Home    Distant Location (provider location):  [facility]     Platform used for Video Visit: [employer]

## 2022-05-27 NOTE — PROGRESS NOTES
[facility]    Hematology/Oncology Return patient visit  video    Today's Date:  May 27, 2022    Reason for Consult: Breast cancer     HISTORY OF PRESENT ILLNESS: Jazmin is a very pleasant 61-year-old patient of mine whom I been seeing for years.  She is transferring care from [place] oncology      1.  Diagnosed with breast cancer T2 invasive lobular carcinoma and invasive ductal carcinoma in May 2016  2.  Oncotype DX low risk of recurrence  3. Mastectomies final tumor was ER positive ND positive HER-2/philippe negative with evidence of lymphovascular invasion 1 out of 2 sentinel lymph nodes were positive for carcinoma measuring 3 mm in size  4.  Total tumor size 4 cm, Oncotype DX score was 13  5.  BRCA testing negative  6.  Letrozole June 2016 to November 2020 changed to Aromasin due to arthralgias  7.  Bone density scan recently shows a T score of -1.2 baseline initially prior to starting was normal  8 Started Aromasin and tolerating it much better    Interval history Jazmin is on video visit. Her ca27-29 elevated at 40, and ALT at 50 (its been high in the 70s before. No new sx. Skin doctor old her to start Niacinaminde   REVIEW OF SYSTEMS:   14 point ROS was reviewed and is negative other than as noted above in HPI.       HOME MEDICATIONS:  Current Outpatient Medications   Medication Sig Dispense Refill     exemestane (AROMASIN) 25 MG tablet Take 1 tablet (25 mg) by mouth daily 90 tablet 3     ibuprofen (ADVIL,MOTRIN) 600 MG tablet Take 1 tablet (600 mg) by mouth every 6 hours as needed for pain (mild) 30 tablet 0     oxyCODONE HCl (OXECTA) 5 MG TABA Take 5 mg by mouth as needed       potassium chloride ER (KLOR-CON M) 20 MEQ CR tablet Take 1 tablet (20 mEq) by mouth in the morning and 1 tablet (20 mEq) in the evening. 180 tablet 1     rizatriptan (MAXALT) 10 MG tablet Take 10 mg by mouth at onset of headache May repeat in 2 hours if needed: max 2/day; average number of headaches monthly 1        triamterene-hydrochlorothiazide (MAXZIDE-25) 37.5-25 MG per tablet Take 1 tablet by mouth daily.       VITAMIN D, CHOLECALCIFEROL, PO Take  by mouth.       acetaminophen (TYLENOL) 325 MG tablet Take 2 tablets (650 mg) by mouth every 4 hours as needed for other (mild pain) (Patient not taking: Reported on 5/27/2022) 100 tablet 0     DULoxetine HCl (CYMBALTA PO) Take 30 mg by mouth daily       LORazepam (ATIVAN) 0.5 MG tablet Take 2 tablets (1 mg) by mouth every 8 hours as needed for anxiety 20 tablet 0     nitrofurantoin, macrocrystal-monohydrate, (MACROBID) 100 MG capsule Take 100 mg by mouth as needed (Patient not taking: Reported on 5/27/2022)       oxycodone (OXYCONTIN) 10 MG 12 hr tablet Take 10 mg by mouth 3 times daily.       Polyethylene Glycol 3350 (MIRALAX PO) Take by mouth as needed (Patient not taking: Reported on 5/27/2022)           ALLERGIES:  Allergies   Allergen Reactions     Flu Virus Vaccine Other (See Comments)     Guillain [place] syndrome         PAST MEDICAL HISTORY:  Past Medical History:   Diagnosis Date     Anxiety      Breast cancer (H) 3/30/2016    Left breast     H/O Guillain-[place] syndrome      Hypertension      Migraines      Peripheral neuropathy     Related to h/o Guillain-[place] syndrome     S/P radiation therapy     6,040 cGy to left chest wall_axilla and 4,500 cGy to left SCV_axilla completed on 8/12/2016 - [facility]         PAST SURGICAL HISTORY:  Past Surgical History:   Procedure Laterality Date     BILATERAL 1ST STAGE BREAST RECONSTRUCTION Bilateral 5/3/2016    Dr. Marry Aldridge     BIOPSY BREAST SEED LOCALIZATION Left 3/30/2016    Procedure: BIOPSY BREAST SEED LOCALIZATION;  Surgeon: Yue Smiley MD;  Location:  [facility]     BREAST BIOPSY, CORE RT/LT Left 9/2/2015     BREAST BIOPSY, CORE RT/LT Left 3/23/2016     CHOLECYSTECTOMY  1998     MAMMOPLASTY REDUCTION Bilateral 1989     MASTECTOMY SIMPLE BILATERAL Bilateral 5/3/2016    Dr. Smiley [facility]     OTHER - AXILLARY SENTINEL LYMPH NODE BIOPSY Left 5/3/2016    Dr. Smiley [facility]     TUBAL LIGATION  1998         SOCIAL HISTORY:  Social History     Socioeconomic History     Marital status:      Spouse name: Not on file     Number of children: Not on file     Years of education: Not on file     Highest education level: Not on file   Occupational History     Not on file   Tobacco Use     Smoking status: Never Smoker     Smokeless tobacco: Never Used   Substance and Sexual Activity     Alcohol use: No     Alcohol/week: 0.0 standard drinks     Drug use: No     Sexual activity: Yes     Partners: Male     Birth control/protection: Female Surgical     Comment: Tubal ligation    Other Topics Concern     Parent/sibling w/ CABG, MI or angioplasty before 65F 55M? Not Asked   Social History Narrative     Not on file     Social Determinants of Health     Financial Resource Strain: Not on file   Food Insecurity: Not on file   Transportation Needs: Not on file   Physical Activity: Not on file   Stress: Not on file   Social Connections: Not on file   Intimate Partner Violence: Not on file   Housing Stability: Not on file         FAMILY HISTORY:  Family History   Problem Relation Age of Onset     Cancer Mother 65        Breast, living     Cancer Father 55        Melanoma, living     Cardiovascular Maternal Grandmother         Heart attack     Cancer Maternal Grandfather 71        Gallbladder,      Unknown/Adopted Paternal Grandmother         old age      Cardiovascular Paternal Grandfather         heart attack     Cancer Brother 40        Testicular, living     Cancer Other         Breast,      Cancer Maternal Aunt 55        Breast, living (mother's twin sister)     Cancer Maternal Aunt 60        Breast, living     Cancer Other 55        Breast, living     Cancer Other         Breast,      Cancer Other         Breast,  (Daughter to above maternal great aunt)      Cancer Other         Breast,  (Daughter to above maternal great aunt)         PHYSICAL EXAM:  Vital signs:  LMP 2009    ECO  GENERAL/CONSTITUTIONAL: No acute distress.        LABS:  CBC RESULTS:   Recent Labs   Lab Test 21  1350   WBC 7.2   RBC 4.87   HGB 13.9   HCT 42.8   MCV 88   MCH 28.5   MCHC 32.5   RDW 13.2          Recent Labs   Lab Test 21  1350 16  0000     --    POTASSIUM 3.3* 3.8   CHLORIDE 106  --    CO2 33*  --    ANIONGAP 3  --    * 90   BUN 14  --    CR 0.87 1.14   DONG 9.2  --    Labs reviewed in detail with the patient and the only abnormality is a potassium level of 3.3      PATHOLOGY:    As per HPI  IMAGING:  None    ASSESSMENT/PLAN:  Jazmin is a very pleasant 61-year-old female who has a history of invasive lobular carcinoma stage IIb ER/MS positive HER-2/philippe negative currently on Aromasin    I discussed with the patient that in her situation being that she is lymph node positive and a lobular cancer with lymphovascular invasion my recommendation would be to continue 10 years of therapy therefore she will complete in .  We will go ahead and plan on doing another bone density in May 2023    Since her T-scores itself -1.2 I think we do have time to consider Zometa especially in the setting of her recent dental work which needed to complete prior to initiating Zometa.    Continue potassium         1.  Breast cancer: continue aromasin, elevated Marker ca27-29 repeat in a week (very mild elevation  3.  Dental work: Continue as per her dentist.  She will likely do a root canal next year.  4.  Bone health: Calcium 1200 mg a day,    Vitamin D 2000 IUs daily plus Osteo Bi-Flex.     5.  Guillain-Barré syndrome: Chronic foot pain continue management per primary care physician    6.  Check vitamin D level normal  7 Mild elevation ALT repeat next week    Virtual visit next week to go over labs, if stable I will see her in 6 months. She will get  her ca27-29 and ALT drawn  In [place].       Parker Richardson MD  Hematology/Oncology  [facility]

## 2022-05-27 NOTE — LETTER
5/27/2022         RE: Jazmin Soler  [address] [facility] [facility] [address]        Dear Colleague,    Thank you for referring your patient, Jazmin Soler, to the [facility]. Please see a copy of my visit note below.    Jazmin is a 62 year old who is being evaluated via a billable video visit.  Currently in state of [place].    How would you like to obtain your AVS? MyChart  If the video visit is dropped, the invitation should be resent by: Text to cell phone: [phone]  Will anyone else be joining your video visit? No       MARIBELL Kam    Video Start Time: 10:05 AM  Video-Visit Details    Type of service:  Video Visit    Video End Time:10:05 AM    Originating Location (pt. Location): Home    Distant Location (provider location):  [facility]     Platform used for Video Visit: [employer]    [facility]    Hematology/Oncology Return patient visit  video    Today's Date:  May 27, 2022    Reason for Consult: Breast cancer     HISTORY OF PRESENT ILLNESS: Jazmin is a very pleasant 61-year-old patient of mine whom I been seeing for years.  She is transferring care from [place] oncology      1.  Diagnosed with breast cancer T2 invasive lobular carcinoma and invasive ductal carcinoma in May 2016  2.  Oncotype DX low risk of recurrence  3. Mastectomies final tumor was ER positive AR positive HER-2/philippe negative with evidence of lymphovascular invasion 1 out of 2 sentinel lymph nodes were positive for carcinoma measuring 3 mm in size  4.  Total tumor size 4 cm, Oncotype DX score was 13  5.  BRCA testing negative  6.  Letrozole June 2016 to November 2020 changed to Aromasin due to arthralgias  7.  Bone density scan recently shows a T score of -1.2 baseline initially prior to starting was normal  8 Started Aromasin and tolerating it much better    Interval history Jazmin is on video visit. Her ca27-29 elevated at 40, and ALT at 50 (its been  high in the 70s before. No new sx. Skin doctor old her to start Niacinaminde   REVIEW OF SYSTEMS:   14 point ROS was reviewed and is negative other than as noted above in HPI.       HOME MEDICATIONS:  Current Outpatient Medications   Medication Sig Dispense Refill     exemestane (AROMASIN) 25 MG tablet Take 1 tablet (25 mg) by mouth daily 90 tablet 3     ibuprofen (ADVIL,MOTRIN) 600 MG tablet Take 1 tablet (600 mg) by mouth every 6 hours as needed for pain (mild) 30 tablet 0     oxyCODONE HCl (OXECTA) 5 MG TABA Take 5 mg by mouth as needed       potassium chloride ER (KLOR-CON M) 20 MEQ CR tablet Take 1 tablet (20 mEq) by mouth in the morning and 1 tablet (20 mEq) in the evening. 180 tablet 1     rizatriptan (MAXALT) 10 MG tablet Take 10 mg by mouth at onset of headache May repeat in 2 hours if needed: max 2/day; average number of headaches monthly 1       triamterene-hydrochlorothiazide (MAXZIDE-25) 37.5-25 MG per tablet Take 1 tablet by mouth daily.       VITAMIN D, CHOLECALCIFEROL, PO Take  by mouth.       acetaminophen (TYLENOL) 325 MG tablet Take 2 tablets (650 mg) by mouth every 4 hours as needed for other (mild pain) (Patient not taking: Reported on 5/27/2022) 100 tablet 0     DULoxetine HCl (CYMBALTA PO) Take 30 mg by mouth daily       LORazepam (ATIVAN) 0.5 MG tablet Take 2 tablets (1 mg) by mouth every 8 hours as needed for anxiety 20 tablet 0     nitrofurantoin, macrocrystal-monohydrate, (MACROBID) 100 MG capsule Take 100 mg by mouth as needed (Patient not taking: Reported on 5/27/2022)       oxycodone (OXYCONTIN) 10 MG 12 hr tablet Take 10 mg by mouth 3 times daily.       Polyethylene Glycol 3350 (MIRALAX PO) Take by mouth as needed (Patient not taking: Reported on 5/27/2022)           ALLERGIES:  Allergies   Allergen Reactions     Flu Virus Vaccine Other (See Comments)     Guillain Ch[place] syndrome         PAST MEDICAL HISTORY:  Past Medical History:   Diagnosis Date     Anxiety      Breast cancer (H)  3/30/2016    Left breast     H/O Guillain-Forsyth syndrome      Hypertension      Migraines      Peripheral neuropathy     Related to h/o Guillain-Forsyth syndrome     S/P radiation therapy     6,040 cGy to left chest wall_axilla and 4,500 cGy to left SCV_axilla completed on 8/12/2016 [facility]         PAST SURGICAL HISTORY:  Past Surgical History:   Procedure Laterality Date     BILATERAL 1ST STAGE BREAST RECONSTRUCTION Bilateral 5/3/2016    Dr. Marry Aldridge     BIOPSY BREAST SEED LOCALIZATION Left 3/30/2016    Procedure: BIOPSY BREAST SEED LOCALIZATION;  Surgeon: Yue Smiley MD;  [facility]: [facility]     BREAST BIOPSY, CORE RT/LT Left 9/2/2015     BREAST BIOPSY, CORE RT/LT Left 3/23/2016     CHOLECYSTECTOMY  1998     MAMMOPLASTY REDUCTION Bilateral 1989     MASTECTOMY SIMPLE BILATERAL Bilateral 5/3/2016    Dr. Smiley [facility]     OTHER - AXILLARY SENTINEL LYMPH NODE BIOPSY Left 5/3/2016     [facility]     TUBAL LIGATION  1998         SOCIAL HISTORY:  Social History     Socioeconomic History     Marital status:      Spouse name: Not on file     Number of children: Not on file     Years of education: Not on file     Highest education level: Not on file   Occupational History     Not on file   Tobacco Use     Smoking status: Never Smoker     Smokeless tobacco: Never Used   Substance and Sexual Activity     Alcohol use: No     Alcohol/week: 0.0 standard drinks     Drug use: No     Sexual activity: Yes     Partners: Male     Birth control/protection: Female Surgical     Comment: Tubal ligation 1998   Other Topics Concern     Parent/sibling w/ CABG, MI or angioplasty before 65F 55M? Not Asked   Social History Narrative     Not on file     Social Determinants of Health     Financial Resource Strain: Not on file   Food Insecurity: Not on file   Transportation Needs: Not on file   Physical Activity: Not on file   Stress: Not on file   Social Connections: Not on  file   Intimate Partner Violence: Not on file   Housing Stability: Not on file         FAMILY HISTORY:  Family History   Problem Relation Age of Onset     Cancer Mother 65        Breast, living     Cancer Father 55        Melanoma, living     Cardiovascular Maternal Grandmother         Heart attack     Cancer Maternal Grandfather 71        Gallbladder,      Unknown/Adopted Paternal Grandmother         old age      Cardiovascular Paternal Grandfather         heart attack     Cancer Brother 40        Testicular, living     Cancer Other         Breast,      Cancer Maternal Aunt 55        Breast, living (mother's twin sister)     Cancer Maternal Aunt 60        Breast, living     Cancer Other 55        Breast, living     Cancer Other         Breast,      Cancer Other         Breast,  (Daughter to above maternal great aunt)     Cancer Other         Breast,  (Daughter to above maternal great aunt)         PHYSICAL EXAM:  Vital signs:  LMP 2009    ECO  GENERAL/CONSTITUTIONAL: No acute distress.        LABS:  CBC RESULTS:   Recent Labs   Lab Test 21  1350   WBC 7.2   RBC 4.87   HGB 13.9   HCT 42.8   MCV 88   MCH 28.5   MCHC 32.5   RDW 13.2          Recent Labs   Lab Test 21  1350 16  0000     --    POTASSIUM 3.3* 3.8   CHLORIDE 106  --    CO2 33*  --    ANIONGAP 3  --    * 90   BUN 14  --    CR 0.87 1.14   DONG 9.2  --    Labs reviewed in detail with the patient and the only abnormality is a potassium level of 3.3      PATHOLOGY:    As per HPI  IMAGING:  None    ASSESSMENT/PLAN:  Jazmin is a very pleasant 61-year-old female who has a history of invasive lobular carcinoma stage IIb ER/NM positive HER-2/philippe negative currently on Aromasin    I discussed with the patient that in her situation being that she is lymph node positive and a lobular cancer with lymphovascular invasion my recommendation would be to continue 10 years of therapy  therefore she will complete in 2026.  We will go ahead and plan on doing another bone density in May 2023    Since her T-scores itself -1.2 I think we do have time to consider Zometa especially in the setting of her recent dental work which needed to complete prior to initiating Zometa.    Continue potassium         1.  Breast cancer: continue aromasin, elevated Marker ca27-29 repeat in a week (very mild elevation  3.  Dental work: Continue as per her dentist.  She will likely do a root canal next year.  4.  Bone health: Calcium 1200 mg a day,    Vitamin D 2000 IUs daily plus Osteo Bi-Flex.     5.  Guillain-Barré syndrome: Chronic foot pain continue management per primary care physician    6.  Check vitamin D level normal  7 Mild elevation ALT repeat next week    Virtual visit next week to go over labs, if stable I will see her in 6 months. She will get her ca27-29 and ALT drawn  In [place].       Parker Richardson MD  Hematology/Oncology  [facility]      Again, thank you for allowing me to participate in the care of your patient.        Sincerely,        Parker iRchardson MD

## 2022-05-27 NOTE — NURSING NOTE
Pt is also taking mirtazapine 7.5mg at night, melatonin 10mg, calcium 1200mg, osteo bi-flex 1 tab.    Lizbeth Montiel, VF

## 2022-05-31 ENCOUNTER — TELEPHONE (OUTPATIENT)
Dept: ONCOLOGY | Facility: CLINIC | Age: 62
End: 2022-05-31
Payer: COMMERCIAL
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
## 2022-06-01 NOTE — TELEPHONE ENCOUNTER
Called patient and discussed the message as noted below, understanding verbalized. She will follow up as indicated and call back with any other questions or concerns.     Parker feliz MD  You 43 minutes ago (7:22 AM)     KL    Yes definitely please move her appt to 3 weeks out thanks    Message text       Sharon Caban RN, BSN, PHN  M.[facility]

## 2022-06-24 ENCOUNTER — LAB (OUTPATIENT)
Dept: LAB | Facility: CLINIC | Age: 62
End: 2022-06-24
Payer: COMMERCIAL

## 2022-06-24 DIAGNOSIS — Z17.0 MALIGNANT NEOPLASM OF NIPPLE OF RIGHT BREAST IN FEMALE, ESTROGEN RECEPTOR POSITIVE (H): ICD-10-CM

## 2022-06-24 DIAGNOSIS — C50.011 MALIGNANT NEOPLASM OF NIPPLE OF RIGHT BREAST IN FEMALE, ESTROGEN RECEPTOR POSITIVE (H): ICD-10-CM

## 2022-06-24 LAB
ALBUMIN SERPL-MCNC: 3.8 G/DL (ref 3.4–5)
ALP SERPL-CCNC: 110 U/L (ref 40–150)
ALT SERPL W P-5'-P-CCNC: 73 U/L (ref 0–50)
AST SERPL W P-5'-P-CCNC: 33 U/L (ref 0–45)
BILIRUB DIRECT SERPL-MCNC: 0.4 MG/DL (ref 0–0.2)
BILIRUB SERPL-MCNC: 2 MG/DL (ref 0.2–1.3)
HOLD SPECIMEN: NORMAL
PROT SERPL-MCNC: 6.8 G/DL (ref 6.8–8.8)

## 2022-06-24 PROCEDURE — 36415 COLL VENOUS BLD VENIPUNCTURE: CPT

## 2022-06-24 PROCEDURE — 80076 HEPATIC FUNCTION PANEL: CPT

## 2022-06-24 PROCEDURE — 86300 IMMUNOASSAY TUMOR CA 15-3: CPT

## 2022-06-25 LAB
Lab: NORMAL
PERFORMING LABORATORY: NORMAL
SPECIMEN STATUS: NORMAL
TEST NAME: NORMAL

## 2022-06-27 ENCOUNTER — VIRTUAL VISIT (OUTPATIENT)
Dept: ONCOLOGY | Facility: CLINIC | Age: 62
End: 2022-06-27
Attending: INTERNAL MEDICINE
Payer: COMMERCIAL

## 2022-06-27 ENCOUNTER — PATIENT OUTREACH (OUTPATIENT)
Dept: ONCOLOGY | Facility: CLINIC | Age: 62
End: 2022-06-27

## 2022-06-27 DIAGNOSIS — Z17.0 MALIGNANT NEOPLASM OF NIPPLE OF RIGHT BREAST IN FEMALE, ESTROGEN RECEPTOR POSITIVE (H): Primary | ICD-10-CM

## 2022-06-27 DIAGNOSIS — C50.011 MALIGNANT NEOPLASM OF NIPPLE OF RIGHT BREAST IN FEMALE, ESTROGEN RECEPTOR POSITIVE (H): Primary | ICD-10-CM

## 2022-06-27 NOTE — LETTER
6/27/2022         RE: Jazmin Soler  [address] [facility] [place] [address]        Dear Colleague,    Thank you for referring your patient, Jazmin Soler, to the [facility]. Please see a copy of my visit note below.    Jazmin is a 62 year old who is being evaluated via a billable video visit.      Pt has pain in feet.    Pt new general doctor is requesting copies of her results. Please send to Dr. Riley at [facility].     How would you like to obtain your AVS? Mail a copy  If the video visit is dropped, the invitation should be resent by: Text to cell phone: [phone]  Will anyone else be joining your video visit? No      Yeny REYNA    Video-Visit Details    Video Start Time: 4:50 PM    Type of service:  Video Visit    Video End Time:4:50 PM    Originating Location (pt. Location): Home    Distant Location (provider location):  [facility]     Platform used for Video Visit: [employer]      Again, thank you for allowing me to participate in the care of your patient.        Sincerely,        Parker Richardson MD

## 2022-06-27 NOTE — PROGRESS NOTES
Patient had video visit scheduled today and tumor markers are still pending. Video visit for today will be canceled and rescheduled to later this week when tumor markers are back. Belen Valenzuela RN,BSN,OCN,CBCN

## 2022-06-27 NOTE — PROGRESS NOTES
Jazmin is a 62 year old who is being evaluated via a billable video visit.      Pt has pain in feet.    Pt new general doctor is requesting copies of her results. Please send to Dr. Riley at [facility].     How would you like to obtain your AVS? Mail a copy  If the video visit is dropped, the invitation should be resent by: Text to cell phone: [phone]  Will anyone else be joining your video visit? No      Yeny REYNA    Video-Visit Details    Video Start Time: 4:50 PM    Type of service:  Video Visit    Video End Time:4:50 PM    Originating Location (pt. Location): Home    Distant Location (provider location):  [facility] [place] [facility]     Platform used for Video Visit: [employer]

## 2022-06-27 NOTE — LETTER
6/27/2022         RE: Jazmin Soler  [address] [facility] [place] [facility] [place] [facility] [address]        Dear Colleague,    Thank you for referring your patient, Jazmin Soler, to the [facility]. Please see a copy of my visit note below.    Jazmin is a 62 year old who is being evaluated via a billable video visit.      Pt has pain in feet.    Pt new general doctor is requesting copies of her results. Please send to Dr. Riley at [facility].     How would you like to obtain your AVS? Mail a copy  If the video visit is dropped, the invitation should be resent by: Text to cell phone: [phone]  Will anyone else be joining your video visit? No      Yeny REYNA    Video-Visit Details    Video Start Time: 4:50 PM    Type of service:  Video Visit    Video End Time:4:50 PM    Originating Location (pt. Location): Home    Distant Location (provider location):  [facility]     Platform used for Video Visit: [employer]      Again, thank you for allowing me to participate in the care of your patient.        Sincerely,        Parker Richardson MD

## 2022-06-28 LAB — MAYO MISC RESULT: NORMAL

## 2022-06-29 ENCOUNTER — PATIENT OUTREACH (OUTPATIENT)
Dept: ONCOLOGY | Facility: CLINIC | Age: 62
End: 2022-06-29

## 2022-06-29 NOTE — PROGRESS NOTES
Jazmin's Ca 27-29 resulted she is aware of result and that her appointment with Dr. Richardson has been rescheduled to 7/1/22 at 0115 PM. Belen Valenzuela RN,BSN,OCN,CBCN

## 2022-07-01 DIAGNOSIS — Z17.0 MALIGNANT NEOPLASM OF NIPPLE OF RIGHT BREAST IN FEMALE, ESTROGEN RECEPTOR POSITIVE (H): Primary | ICD-10-CM

## 2022-07-01 DIAGNOSIS — C50.011 MALIGNANT NEOPLASM OF NIPPLE OF RIGHT BREAST IN FEMALE, ESTROGEN RECEPTOR POSITIVE (H): Primary | ICD-10-CM

## 2022-10-12 ENCOUNTER — LAB (OUTPATIENT)
Dept: LAB | Facility: CLINIC | Age: 62
End: 2022-10-12
Payer: COMMERCIAL

## 2022-10-12 DIAGNOSIS — C50.011 MALIGNANT NEOPLASM OF NIPPLE OF RIGHT BREAST IN FEMALE, ESTROGEN RECEPTOR POSITIVE (H): ICD-10-CM

## 2022-10-12 DIAGNOSIS — Z17.0 MALIGNANT NEOPLASM OF NIPPLE OF RIGHT BREAST IN FEMALE, ESTROGEN RECEPTOR POSITIVE (H): ICD-10-CM

## 2022-10-12 LAB
ALBUMIN SERPL-MCNC: 3.7 G/DL (ref 3.4–5)
ALP SERPL-CCNC: 107 U/L (ref 40–150)
ALT SERPL W P-5'-P-CCNC: 36 U/L (ref 0–50)
ANION GAP SERPL CALCULATED.3IONS-SCNC: 3 MMOL/L (ref 3–14)
AST SERPL W P-5'-P-CCNC: 21 U/L (ref 0–45)
BILIRUB SERPL-MCNC: 1.8 MG/DL (ref 0.2–1.3)
BUN SERPL-MCNC: 16 MG/DL (ref 7–30)
CALCIUM SERPL-MCNC: 9.6 MG/DL (ref 8.5–10.1)
CHLORIDE BLD-SCNC: 105 MMOL/L (ref 94–109)
CO2 SERPL-SCNC: 32 MMOL/L (ref 20–32)
CREAT SERPL-MCNC: 0.86 MG/DL (ref 0.52–1.04)
GFR SERPL CREATININE-BSD FRML MDRD: 75 ML/MIN/1.73M2
GLUCOSE BLD-MCNC: 72 MG/DL (ref 70–99)
HOLD SPECIMEN: NORMAL
HOLD SPECIMEN: NORMAL
POTASSIUM BLD-SCNC: 3.6 MMOL/L (ref 3.4–5.3)
PROT SERPL-MCNC: 7.1 G/DL (ref 6.8–8.8)
SODIUM SERPL-SCNC: 140 MMOL/L (ref 133–144)

## 2022-10-12 PROCEDURE — 36415 COLL VENOUS BLD VENIPUNCTURE: CPT

## 2022-10-12 PROCEDURE — 80053 COMPREHEN METABOLIC PANEL: CPT

## 2022-10-15 ENCOUNTER — HEALTH MAINTENANCE LETTER (OUTPATIENT)
Age: 62
End: 2022-10-15

## 2022-10-19 ENCOUNTER — TELEPHONE (OUTPATIENT)
Dept: ONCOLOGY | Facility: CLINIC | Age: 62
End: 2022-10-19

## 2022-10-19 NOTE — TELEPHONE ENCOUNTER
Called Jazmin with her lab results. Dr. Richardson did review her lab results, no concern. She was called and is aware of it. She will have labs and follow up in 1/23 with Dr. Richardson. Belen Valenzuela RN,BSN,OCN,CBCN

## 2022-10-31 ENCOUNTER — PATIENT OUTREACH (OUTPATIENT)
Dept: CARE COORDINATION | Facility: CLINIC | Age: 62
End: 2022-10-31

## 2022-11-02 ENCOUNTER — PATIENT OUTREACH (OUTPATIENT)
Dept: CARE COORDINATION | Facility: CLINIC | Age: 62
End: 2022-11-02

## 2023-01-04 ENCOUNTER — PATIENT OUTREACH (OUTPATIENT)
Dept: CARE COORDINATION | Facility: CLINIC | Age: 63
End: 2023-01-04

## 2023-01-12 ENCOUNTER — LAB (OUTPATIENT)
Dept: LAB | Facility: CLINIC | Age: 63
End: 2023-01-12
Payer: COMMERCIAL

## 2023-01-12 DIAGNOSIS — Z17.0 MALIGNANT NEOPLASM OF NIPPLE OF RIGHT BREAST IN FEMALE, ESTROGEN RECEPTOR POSITIVE (H): ICD-10-CM

## 2023-01-12 DIAGNOSIS — C50.011 MALIGNANT NEOPLASM OF NIPPLE OF RIGHT BREAST IN FEMALE, ESTROGEN RECEPTOR POSITIVE (H): ICD-10-CM

## 2023-01-12 LAB
ALBUMIN SERPL-MCNC: 3.7 G/DL (ref 3.4–5)
ALP SERPL-CCNC: 100 U/L (ref 40–150)
ALT SERPL W P-5'-P-CCNC: 41 U/L (ref 0–50)
ANION GAP SERPL CALCULATED.3IONS-SCNC: 6 MMOL/L (ref 3–14)
AST SERPL W P-5'-P-CCNC: 20 U/L (ref 0–45)
BASOPHILS # BLD AUTO: 0.1 10E3/UL (ref 0–0.2)
BASOPHILS NFR BLD AUTO: 1 %
BILIRUB SERPL-MCNC: 1.4 MG/DL (ref 0.2–1.3)
BUN SERPL-MCNC: 17 MG/DL (ref 7–30)
CALCIUM SERPL-MCNC: 9.8 MG/DL (ref 8.5–10.1)
CANCER AG15-3 SERPL-ACNC: 27 U/ML
CEA SERPL-MCNC: 1.4 NG/ML
CHLORIDE BLD-SCNC: 103 MMOL/L (ref 94–109)
CO2 SERPL-SCNC: 32 MMOL/L (ref 20–32)
CREAT SERPL-MCNC: 0.91 MG/DL (ref 0.52–1.04)
EOSINOPHIL # BLD AUTO: 0.3 10E3/UL (ref 0–0.7)
EOSINOPHIL NFR BLD AUTO: 4 %
ERYTHROCYTE [DISTWIDTH] IN BLOOD BY AUTOMATED COUNT: 13.5 % (ref 10–15)
GFR SERPL CREATININE-BSD FRML MDRD: 70 ML/MIN/1.73M2
GLUCOSE BLD-MCNC: 96 MG/DL (ref 70–99)
HCT VFR BLD AUTO: 43.7 % (ref 35–47)
HGB BLD-MCNC: 14.3 G/DL (ref 11.7–15.7)
IMM GRANULOCYTES # BLD: 0.1 10E3/UL
IMM GRANULOCYTES NFR BLD: 1 %
LYMPHOCYTES # BLD AUTO: 2.4 10E3/UL (ref 0.8–5.3)
LYMPHOCYTES NFR BLD AUTO: 31 %
MCH RBC QN AUTO: 29 PG (ref 26.5–33)
MCHC RBC AUTO-ENTMCNC: 32.7 G/DL (ref 31.5–36.5)
MCV RBC AUTO: 89 FL (ref 78–100)
MONOCYTES # BLD AUTO: 0.6 10E3/UL (ref 0–1.3)
MONOCYTES NFR BLD AUTO: 8 %
NEUTROPHILS # BLD AUTO: 4.3 10E3/UL (ref 1.6–8.3)
NEUTROPHILS NFR BLD AUTO: 55 %
NRBC # BLD AUTO: 0 10E3/UL
NRBC BLD AUTO-RTO: 0 /100
PLATELET # BLD AUTO: 246 10E3/UL (ref 150–450)
POTASSIUM BLD-SCNC: 3.7 MMOL/L (ref 3.4–5.3)
PROT SERPL-MCNC: 7.1 G/DL (ref 6.8–8.8)
RBC # BLD AUTO: 4.93 10E6/UL (ref 3.8–5.2)
SODIUM SERPL-SCNC: 141 MMOL/L (ref 133–144)
WBC # BLD AUTO: 7.8 10E3/UL (ref 4–11)

## 2023-01-12 PROCEDURE — 86300 IMMUNOASSAY TUMOR CA 15-3: CPT

## 2023-01-12 PROCEDURE — 85025 COMPLETE CBC W/AUTO DIFF WBC: CPT

## 2023-01-12 PROCEDURE — 82378 CARCINOEMBRYONIC ANTIGEN: CPT

## 2023-01-12 PROCEDURE — 80053 COMPREHEN METABOLIC PANEL: CPT

## 2023-01-12 PROCEDURE — 36415 COLL VENOUS BLD VENIPUNCTURE: CPT

## 2023-01-18 ENCOUNTER — ONCOLOGY VISIT (OUTPATIENT)
Dept: ONCOLOGY | Facility: CLINIC | Age: 63
End: 2023-01-18
Attending: INTERNAL MEDICINE
Payer: COMMERCIAL

## 2023-01-18 VITALS
SYSTOLIC BLOOD PRESSURE: 148 MMHG | BODY MASS INDEX: 37.69 KG/M2 | RESPIRATION RATE: 14 BRPM | HEART RATE: 68 BPM | OXYGEN SATURATION: 98 % | TEMPERATURE: 97.7 F | WEIGHT: 219.6 LBS | DIASTOLIC BLOOD PRESSURE: 88 MMHG

## 2023-01-18 DIAGNOSIS — F41.9 ANXIETY: Primary | ICD-10-CM

## 2023-01-18 DIAGNOSIS — Z79.811 LONG TERM (CURRENT) USE OF AROMATASE INHIBITORS: ICD-10-CM

## 2023-01-18 PROCEDURE — 99212 OFFICE O/P EST SF 10 MIN: CPT | Performed by: INTERNAL MEDICINE

## 2023-01-18 PROCEDURE — 99214 OFFICE O/P EST MOD 30 MIN: CPT | Performed by: INTERNAL MEDICINE

## 2023-01-18 RX ORDER — MORPHINE SULFATE 15 MG/1
15 TABLET, FILM COATED, EXTENDED RELEASE ORAL
COMMUNITY
Start: 2022-12-27

## 2023-01-18 RX ORDER — ESCITALOPRAM OXALATE 10 MG/1
10 TABLET ORAL DAILY
Qty: 90 TABLET | Refills: 3 | Status: SHIPPED | OUTPATIENT
Start: 2023-01-18 | End: 2024-03-04

## 2023-01-18 RX ORDER — ESTRADIOL 10 UG/1
TABLET VAGINAL
COMMUNITY
Start: 2023-01-12

## 2023-01-18 ASSESSMENT — PAIN SCALES - GENERAL: PAINLEVEL: MILD PAIN (2)
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
## 2023-01-18 NOTE — PROGRESS NOTES
[facility]    Hematology/Oncology Return patient visit  video    Today's Date: Jan 18, 2023  Reason for Consult: Breast cancer     HISTORY OF PRESENT ILLNESS: Jazmin is a very pleasant 62-year-old patient of mine whom I been seeing for years.  She is transferring care from [place] [facility]      1.  Diagnosed with breast cancer T2 invasive lobular carcinoma and invasive ductal carcinoma in May 2016  2.  Oncotype DX low risk of recurrence  3. Mastectomies final tumor was ER positive AR positive HER-2/philippe negative with evidence of lymphovascular invasion 1 out of 2 sentinel lymph nodes were positive for carcinoma measuring 3 mm in size  4.  Total tumor size 4 cm, Oncotype DX score was 13  5.  BRCA testing negative  6.  Letrozole June 2016 to November 2020 changed to Aromasin due to arthralgias  7.  Bone density scan recently shows a T score of -1.2 baseline initially prior to starting was normal  8 Started Aromasin and tolerating it much better    Interval history Jazmin presents for follow-up today.  Unfortunately her  Power passed away from a motorcycle accident and she continues to be distraught from that understandably.Her labs are all overall stable except for her CA 15-3 came back at slightly higher level of 27.  She continues to feel well.  REVIEW OF SYSTEMS:   14 point ROS was reviewed and is negative other than as noted above in HPI.       HOME MEDICATIONS:  Current Outpatient Medications   Medication Sig Dispense Refill     escitalopram (LEXAPRO) 10 MG tablet Take 1 tablet (10 mg) by mouth daily 90 tablet 3     exemestane (AROMASIN) 25 MG tablet Take 1 tablet (25 mg) by mouth daily 90 tablet 3     ibuprofen (ADVIL,MOTRIN) 600 MG tablet Take 1 tablet (600 mg) by mouth every 6 hours as needed for pain (mild) 30 tablet 0     morphine (MS CONTIN) 15 MG CR tablet Take 15 mg by mouth       oxyCODONE HCl (OXECTA) 5 MG TABA Take 5 mg by mouth as needed       Polyethylene Glycol 3350  (MIRALAX PO) Take by mouth as needed       potassium chloride ER (KLOR-CON M) 20 MEQ CR tablet Take 1 tablet (20 mEq) by mouth in the morning and 1 tablet (20 mEq) in the evening. 180 tablet 1     rizatriptan (MAXALT) 10 MG tablet Take 10 mg by mouth at onset of headache May repeat in 2 hours if needed: max 2/day; average number of headaches monthly 1       triamterene-hydrochlorothiazide (MAXZIDE-25) 37.5-25 MG per tablet Take 1 tablet by mouth daily.       VITAMIN D, CHOLECALCIFEROL, PO Take  by mouth.       DULoxetine HCl (CYMBALTA PO) Take 30 mg by mouth daily       LORazepam (ATIVAN) 0.5 MG tablet Take 2 tablets (1 mg) by mouth every 8 hours as needed for anxiety 20 tablet 0     nitrofurantoin, macrocrystal-monohydrate, (MACROBID) 100 MG capsule Take 100 mg by mouth as needed (Patient not taking: Reported on 1/18/2023)       oxycodone (OXYCONTIN) 10 MG 12 hr tablet Take 10 mg by mouth 3 times daily.       YUVAFEM 10 MCG TABS vaginal tablet  (Patient not taking: Reported on 1/18/2023)           ALLERGIES:  Allergies   Allergen Reactions     Flu Virus Vaccine Other (See Comments)     Guillain Vivian syndrome         PAST MEDICAL HISTORY:  Past Medical History:   Diagnosis Date     Anxiety      Breast cancer (H) 3/30/2016    Left breast     H/O Guillain-Vivian syndrome      Hypertension      Migraines      Peripheral neuropathy     Related to h/o Guillain-Vivian syndrome     S/P radiation therapy     6,040 cGy to left chest wall_axilla and 4,500 cGy to left SCV_axilla completed on 8/12/2016 [facility]         PAST SURGICAL HISTORY:  Past Surgical History:   Procedure Laterality Date     BILATERAL 1ST STAGE BREAST RECONSTRUCTION Bilateral 5/3/2016    Dr. Marry Aldridge     BIOPSY BREAST SEED LOCALIZATION Left 3/30/2016    Procedure: BIOPSY BREAST SEED LOCALIZATION;  Surgeon: Yue Smiley MD;  Location:  [facility]     BREAST BIOPSY, CORE RT/LT Left 9/2/2015     BREAST BIOPSY, CORE RT/LT Left 3/23/2016      CHOLECYSTECTOMY  1998     MAMMOPLASTY REDUCTION Bilateral      MASTECTOMY SIMPLE BILATERAL Bilateral 5/3/2016     [facility]     OTHER - AXILLARY SENTINEL LYMPH NODE BIOPSY Left 5/3/2016     [facility]     TUBAL LIGATION           SOCIAL HISTORY:  Social History     Socioeconomic History     Marital status:      Spouse name: Not on file     Number of children: Not on file     Years of education: Not on file     Highest education level: Not on file   Occupational History     Not on file   Tobacco Use     Smoking status: Never     Smokeless tobacco: Never   Substance and Sexual Activity     Alcohol use: No     Alcohol/week: 0.0 standard drinks     Drug use: No     Sexual activity: Yes     Partners: Male     Birth control/protection: Female Surgical     Comment: Tubal ligation    Other Topics Concern     Parent/sibling w/ CABG, MI or angioplasty before 65F 55M? Not Asked   Social History Narrative     Not on file     Social Determinants of Health     Financial Resource Strain: Not on file   Food Insecurity: Not on file   Transportation Needs: Not on file   Physical Activity: Not on file   Stress: Not on file   Social Connections: Not on file   Intimate Partner Violence: Not on file   Housing Stability: Not on file         FAMILY HISTORY:  Family History   Problem Relation Age of Onset     Cancer Mother 65        Breast, living     Cancer Father 55        Melanoma, living     Cardiovascular Maternal Grandmother         Heart attack     Cancer Maternal Grandfather 71        Gallbladder,      Unknown/Adopted Paternal Grandmother         old age      Cardiovascular Paternal Grandfather         heart attack     Cancer Brother 40        Testicular, living     Cancer Other         Breast,      Cancer Maternal Aunt 55        Breast, living (mother's twin sister)     Cancer Maternal Aunt 60        Breast, living     Cancer Other 55         Breast, living     Cancer Other         Breast,      Cancer Other         Breast,  (Daughter to above maternal great aunt)     Cancer Other         Breast,  (Daughter to above maternal great aunt)         PHYSICAL EXAM:  Vital signs:  BP (!) 148/88   Pulse 68   Temp 97.7  F (36.5  C) (Oral)   Resp 14   Wt 99.6 kg (219 lb 9.6 oz)   LMP 2009   SpO2 98%   BMI 37.69 kg/m     ECO  GENERAL/CONSTITUTIONAL: No acute distress.  Heart regular rate and rhythm  Lungs clear    70s extremities no edema  Status post bilateral mastectomies with reconstruction no evidence of recurrence      LABS:  Labs noted and reviewed all stable except for CA 15-3 which came back at 27      PATHOLOGY:    As per HPI    IMAGING:  None    ASSESSMENT/PLAN:  Jazmin is a very pleasant 62-year-old female who has a history of invasive lobular carcinoma stage IIb ER/IN positive HER-2/philippe negative currently on Aromasin    I discussed with the patient that in her situation being that she is lymph node positive and a lobular cancer with lymphovascular invasion my recommendation would be to continue 10 years of therapy therefore she will complete in .  We will go ahead and plan on doing another bone density in May 2023     Since her T-scores itself -1.2 I think we do have time to consider Zometa especially in the setting of her recent dental work which needed to complete prior to initiating Zometa.    Continue potassium         1.  Breast cancer: continue aromasin, elevated Marker ca27-29 repeat in a week (very mild elevation)  2 anxiety/depression  Start lexapro 10 mg a day  3.  Dental work: Continue as per her dentist.       4.  Bone health: Calcium 1200 mg a day,    Vitamin D 2000 IUs daily plus Osteo Bi-Flex. Dexa prior to next visit     5.  Guillain-Barré syndrome: Chronic foot pain continue management per primary care physician.   6 month hay Richardson MD  Hematology/Oncology  [facility]  [facility]

## 2023-01-18 NOTE — PROGRESS NOTES
"Oncology Rooming Note    January 18, 2023 11:10 AM   Jazmin Soler is a 62 year old female who presents for:    Chief Complaint   Patient presents with     Oncology Clinic Visit     Initial Vitals: BP (!) 148/88   Pulse 68   Temp 97.7  F (36.5  C) (Oral)   Resp 14   Wt 99.6 kg (219 lb 9.6 oz)   LMP 12/22/2009   SpO2 98%   BMI 37.69 kg/m   Estimated body mass index is 37.69 kg/m  as calculated from the following:    Height as of 10/5/16: 1.626 m (5' 4\").    Weight as of this encounter: 99.6 kg (219 lb 9.6 oz). Body surface area is 2.12 meters squared.  Mild Pain (2) Comment: Data Unavailable   Patient's last menstrual period was 12/22/2009.  Allergies reviewed: Yes  Medications reviewed: Yes    Medications: Medication refills not needed today.  Pharmacy name entered into [employer]:    [facility] [id number] [place] [facility] [address] PHARMACY [place] [address]    Clinical concerns: Go over tests Dr. Richardson was notified.      Shari J. Schoenberger, [facility]              "

## 2023-01-25 ENCOUNTER — TELEPHONE (OUTPATIENT)
Dept: ONCOLOGY | Facility: CLINIC | Age: 63
End: 2023-01-25
Payer: MEDICARE

## 2023-01-25 NOTE — TELEPHONE ENCOUNTER
Called Jazmin she is aware per Dr. Richardson based on her last Ca 15-3 level that she would like a repeat 15-3 level drawn in 3 weeks. Jazmin will have it drawn at [facility] secondary to closer to her home. Belen Valenzuela RN,BSN,OCN,CBCN

## 2023-01-27 ENCOUNTER — TELEPHONE (OUTPATIENT)
Dept: ONCOLOGY | Facility: CLINIC | Age: 63
End: 2023-01-27
Payer: MEDICARE

## 2023-01-27 NOTE — TELEPHONE ENCOUNTER
Left voicemail message for patient requesting a return call regarding scheduling a Dexa Scan. [facility] no longer does the Dexa Scan, patient will need to schedule at [facility].

## 2023-02-22 ENCOUNTER — PATIENT OUTREACH (OUTPATIENT)
Dept: CARE COORDINATION | Facility: CLINIC | Age: 63
End: 2023-02-22
Payer: MEDICARE

## 2023-02-27 NOTE — TELEPHONE ENCOUNTER
Jazmin called clinic stating that her tumor marker test has been delayed secondary to her contacting Covid. She stated that she will make an appointment to have her bloodwork done next Wednesday 3/8/23. Belen Valenzuela RN,BSN,OCN,CBCN

## 2023-03-09 ENCOUNTER — LAB (OUTPATIENT)
Dept: LAB | Facility: CLINIC | Age: 63
End: 2023-03-09
Payer: MEDICARE

## 2023-03-09 DIAGNOSIS — F41.9 ANXIETY: ICD-10-CM

## 2023-03-09 LAB — CANCER AG15-3 SERPL-ACNC: 27 U/ML

## 2023-03-09 PROCEDURE — 86300 IMMUNOASSAY TUMOR CA 15-3: CPT

## 2023-03-09 PROCEDURE — 36415 COLL VENOUS BLD VENIPUNCTURE: CPT

## 2023-03-20 NOTE — TELEPHONE ENCOUNTER
Reviewed Jazmin's Ca 15-3 with Dr. Richardson. Her Ca 15-3 came back the same at 27. No current follow up needed per Dr. Richardson. Jazmin will have labs drawn again in 7/23 prior to her exam with Dr. Richardson. She will schedule those labs in [place]. Belen Valenzuela RN,BSN,OCN,CBCN

## 2023-03-26 ENCOUNTER — HEALTH MAINTENANCE LETTER (OUTPATIENT)
Age: 63
End: 2023-03-26

## 2023-05-24 ENCOUNTER — ANCILLARY PROCEDURE (OUTPATIENT)
Dept: BONE DENSITY | Facility: CLINIC | Age: 63
End: 2023-05-24
Attending: INTERNAL MEDICINE
Payer: MEDICARE

## 2023-05-24 DIAGNOSIS — Z79.811 LONG TERM (CURRENT) USE OF AROMATASE INHIBITORS: ICD-10-CM

## 2023-05-24 DIAGNOSIS — F41.9 ANXIETY: ICD-10-CM

## 2023-05-24 PROCEDURE — 77080 DXA BONE DENSITY AXIAL: CPT | Performed by: RADIOLOGY

## 2023-06-07 DIAGNOSIS — C50.919 BREAST CANCER (H): ICD-10-CM

## 2023-06-13 RX ORDER — EXEMESTANE 25 MG/1
TABLET ORAL
Qty: 90 TABLET | Refills: 3 | Status: SHIPPED | OUTPATIENT
Start: 2023-06-13 | End: 2024-05-07

## 2023-07-17 ENCOUNTER — LAB (OUTPATIENT)
Dept: LAB | Facility: CLINIC | Age: 63
End: 2023-07-17
Payer: MEDICARE

## 2023-07-17 DIAGNOSIS — Z79.811 LONG TERM (CURRENT) USE OF AROMATASE INHIBITORS: ICD-10-CM

## 2023-07-17 DIAGNOSIS — F41.9 ANXIETY: ICD-10-CM

## 2023-07-17 LAB
ALBUMIN SERPL BCG-MCNC: 4.3 G/DL (ref 3.5–5.2)
ALP SERPL-CCNC: 104 U/L (ref 35–104)
ALT SERPL W P-5'-P-CCNC: 27 U/L (ref 0–50)
ANION GAP SERPL CALCULATED.3IONS-SCNC: 8 MMOL/L (ref 7–15)
AST SERPL W P-5'-P-CCNC: 34 U/L (ref 0–45)
BILIRUB SERPL-MCNC: 1.2 MG/DL
BUN SERPL-MCNC: 17.7 MG/DL (ref 8–23)
CALCIUM SERPL-MCNC: 9.6 MG/DL (ref 8.8–10.2)
CANCER AG15-3 SERPL-ACNC: 28 U/ML
CEA SERPL-MCNC: 1.3 NG/ML
CHLORIDE SERPL-SCNC: 101 MMOL/L (ref 98–107)
CREAT SERPL-MCNC: 0.95 MG/DL (ref 0.51–0.95)
DEPRECATED HCO3 PLAS-SCNC: 30 MMOL/L (ref 22–29)
ERYTHROCYTE [DISTWIDTH] IN BLOOD BY AUTOMATED COUNT: 12.8 % (ref 10–15)
GFR SERPL CREATININE-BSD FRML MDRD: 66 ML/MIN/1.73M2
GLUCOSE SERPL-MCNC: 149 MG/DL (ref 70–99)
HCT VFR BLD AUTO: 42.1 % (ref 35–47)
HGB BLD-MCNC: 13.9 G/DL (ref 11.7–15.7)
MCH RBC QN AUTO: 29.4 PG (ref 26.5–33)
MCHC RBC AUTO-ENTMCNC: 33 G/DL (ref 31.5–36.5)
MCV RBC AUTO: 89 FL (ref 78–100)
PLATELET # BLD AUTO: 215 10E3/UL (ref 150–450)
POTASSIUM SERPL-SCNC: 3.9 MMOL/L (ref 3.4–5.3)
PROT SERPL-MCNC: 6.7 G/DL (ref 6.4–8.3)
RBC # BLD AUTO: 4.73 10E6/UL (ref 3.8–5.2)
SODIUM SERPL-SCNC: 139 MMOL/L (ref 136–145)
WBC # BLD AUTO: 7.5 10E3/UL (ref 4–11)

## 2023-07-17 PROCEDURE — 36415 COLL VENOUS BLD VENIPUNCTURE: CPT

## 2023-07-17 PROCEDURE — 85027 COMPLETE CBC AUTOMATED: CPT

## 2023-07-17 PROCEDURE — 82378 CARCINOEMBRYONIC ANTIGEN: CPT

## 2023-07-17 PROCEDURE — 80053 COMPREHEN METABOLIC PANEL: CPT

## 2023-07-17 PROCEDURE — 86300 IMMUNOASSAY TUMOR CA 15-3: CPT

## 2023-07-19 ENCOUNTER — ONCOLOGY VISIT (OUTPATIENT)
Dept: ONCOLOGY | Facility: CLINIC | Age: 63
End: 2023-07-19
Attending: INTERNAL MEDICINE
Payer: MEDICARE

## 2023-07-19 VITALS
HEART RATE: 66 BPM | RESPIRATION RATE: 16 BRPM | SYSTOLIC BLOOD PRESSURE: 120 MMHG | OXYGEN SATURATION: 98 % | DIASTOLIC BLOOD PRESSURE: 78 MMHG

## 2023-07-19 DIAGNOSIS — F41.9 ANXIETY: ICD-10-CM

## 2023-07-19 DIAGNOSIS — C50.012 MALIGNANT NEOPLASM OF NIPPLE OF BOTH BREASTS IN FEMALE, ESTROGEN RECEPTOR POSITIVE (H): Primary | ICD-10-CM

## 2023-07-19 DIAGNOSIS — C50.011 MALIGNANT NEOPLASM OF NIPPLE OF BOTH BREASTS IN FEMALE, ESTROGEN RECEPTOR POSITIVE (H): Primary | ICD-10-CM

## 2023-07-19 DIAGNOSIS — Z17.0 MALIGNANT NEOPLASM OF NIPPLE OF BOTH BREASTS IN FEMALE, ESTROGEN RECEPTOR POSITIVE (H): Primary | ICD-10-CM

## 2023-07-19 DIAGNOSIS — Z79.811 LONG TERM (CURRENT) USE OF AROMATASE INHIBITORS: ICD-10-CM

## 2023-07-19 PROCEDURE — 99213 OFFICE O/P EST LOW 20 MIN: CPT | Performed by: INTERNAL MEDICINE

## 2023-07-19 PROCEDURE — 99214 OFFICE O/P EST MOD 30 MIN: CPT | Performed by: INTERNAL MEDICINE

## 2023-07-19 PROCEDURE — G0463 HOSPITAL OUTPT CLINIC VISIT: HCPCS | Performed by: INTERNAL MEDICINE

## 2023-07-19 ASSESSMENT — PAIN SCALES - GENERAL: PAINLEVEL: MILD PAIN (2)

## 2023-07-19 NOTE — LETTER
7/19/2023         RE: Jazmin Soler  [address] [place] [address]        Dear Colleague,    Thank you for referring your patient, Jazmin Soler, to the [facility]. Please see a copy of my visit note below.    [facility]    Hematology/Oncology Return patient visit  video    Today's Date: July 19, 2023     Reason for Consult: Breast cancer     HISTORY OF PRESENT ILLNESS: Jazmin is a very pleasant 62-year-old patient of mine whom I been seeing for years.  She is transferring care from [place] [facility]      1.  Diagnosed with breast cancer T2 invasive lobular carcinoma and invasive ductal carcinoma in May 2016  2.  Oncotype DX low risk of recurrence  3. Mastectomies final tumor was ER positive NC positive HER-2/philippe negative with evidence of lymphovascular invasion 1 out of 2 sentinel lymph nodes were positive for carcinoma measuring 3 mm in size  4.  Total tumor size 4 cm, Oncotype DX score was 13  5.  BRCA testing negative  6.  Letrozole June 2016 to November 2020 changed to Aromasin due to arthralgias  7.  Bone density scan recently shows a T score of -1.2 baseline initially prior to starting was normal  8 Started Aromasin and tolerating it much better    Interval history Jazmin presents for follow-up today.  Overall she is doing well.  She tried to get off the Lexapro but then had more issues with depression therefore she went back on it.  She is doing well on it.    REVIEW OF SYSTEMS:   14 point ROS was reviewed and is negative other than as noted above in HPI.       HOME MEDICATIONS:  Current Outpatient Medications   Medication Sig Dispense Refill     escitalopram (LEXAPRO) 10 MG tablet Take 1 tablet (10 mg) by mouth daily 90 tablet 3     exemestane (AROMASIN) 25 MG tablet TAKE 1 TABLET BY MOUTH EVERY DAY 90 tablet 3     ibuprofen (ADVIL,MOTRIN) 600 MG tablet Take 1 tablet (600 mg) by mouth every 6 hours as needed for pain (mild) 30 tablet 0      morphine (MS CONTIN) 15 MG CR tablet Take 15 mg by mouth       oxyCODONE HCl (OXECTA) 5 MG TABA Take 5 mg by mouth as needed       potassium chloride ER (KLOR-CON M) 20 MEQ CR tablet Take 1 tablet (20 mEq) by mouth in the morning and 1 tablet (20 mEq) in the evening. 180 tablet 1     rizatriptan (MAXALT) 10 MG tablet Take 10 mg by mouth at onset of headache May repeat in 2 hours if needed: max 2/day; average number of headaches monthly 1       triamterene-hydrochlorothiazide (MAXZIDE-25) 37.5-25 MG per tablet Take 1 tablet by mouth daily.       VITAMIN D, CHOLECALCIFEROL, PO Take  by mouth.       YUVAFEM 10 MCG TABS vaginal tablet  (Patient not taking: Reported on 7/19/2023)           ALLERGIES:  Allergies   Allergen Reactions     Influenza Virus Vaccine Other (See Comments)     Guillain [place] syndrome         PAST MEDICAL HISTORY:  Past Medical History:   Diagnosis Date     Anxiety      Breast cancer (H) 3/30/2016    Left breast     H/O Guillain-[place] syndrome      Hypertension      Migraines      Peripheral neuropathy     Related to h/o Guillain-[place] syndrome     S/P radiation therapy     6,040 cGy to left chest wall_axilla and 4,500 cGy to left SCV_axilla completed on 8/12/2016 - [facility]         PAST SURGICAL HISTORY:  Past Surgical History:   Procedure Laterality Date     BILATERAL 1ST STAGE BREAST RECONSTRUCTION Bilateral 5/3/2016    Dr. Marry Aldridge     BIOPSY BREAST SEED LOCALIZATION Left 3/30/2016    Procedure: BIOPSY BREAST SEED LOCALIZATION;  Surgeon: Yue Smiley MD;  Location: [facility]     BREAST BIOPSY, CORE RT/LT Left 9/2/2015     BREAST BIOPSY, CORE RT/LT Left 3/23/2016     CHOLECYSTECTOMY  1998     MAMMOPLASTY REDUCTION Bilateral 1989     MASTECTOMY SIMPLE BILATERAL Bilateral 5/3/2016     [facility]     OTHER - AXILLARY SENTINEL LYMPH NODE BIOPSY Left 5/3/2016     [facility]     TUBAL LIGATION  1998         SOCIAL HISTORY:  Social  History     Socioeconomic History     Marital status:      Spouse name: Not on file     Number of children: Not on file     Years of education: Not on file     Highest education level: Not on file   Occupational History     Not on file   Tobacco Use     Smoking status: Never     Smokeless tobacco: Never   Substance and Sexual Activity     Alcohol use: No     Alcohol/week: 0.0 standard drinks of alcohol     Drug use: No     Sexual activity: Yes     Partners: Male     Birth control/protection: Female Surgical     Comment: Tubal ligation    Other Topics Concern     Parent/sibling w/ CABG, MI or angioplasty before 65F 55M? Not Asked   Social History Narrative     Not on file     Social Determinants of Health     Financial Resource Strain: Not on file   Food Insecurity: Not on file   Transportation Needs: Not on file   Physical Activity: Not on file   Stress: Not on file   Social Connections: Not on file   Intimate Partner Violence: Not on file   Housing Stability: Not on file         FAMILY HISTORY:  Family History   Problem Relation Age of Onset     Cancer Mother 65        Breast, living     Cancer Father 55        Melanoma, living     Cardiovascular Maternal Grandmother         Heart attack     Cancer Maternal Grandfather 71        Gallbladder,      Unknown/Adopted Paternal Grandmother         old age      Cardiovascular Paternal Grandfather         heart attack     Cancer Brother 40        Testicular, living     Cancer Other         Breast,      Cancer Maternal Aunt 55        Breast, living (mother's twin sister)     Cancer Maternal Aunt 60        Breast, living     Cancer Other 55        Breast, living     Cancer Other         Breast,      Cancer Other         Breast,  (Daughter to above maternal great aunt)     Cancer Other         Breast,  (Daughter to above maternal great aunt)         PHYSICAL EXAM:  Vital signs:  /78   Pulse 66   Resp 16   LMP  2009   SpO2 98%    ECO  GENERAL/CONSTITUTIONAL: No acute distress.  Heart regular rate and rhythm  Lungs clear       Status post bilateral mastectomies with reconstruction no evidence of recurrence      LABS:  Labs noted and reviewed all stable except for CA 15-3 which came back at 27      PATHOLOGY:    As per HPI    IMAGING:  None    ASSESSMENT/PLAN:  Jazmin is a very pleasant 62-year-old female who has a history of invasive lobular carcinoma stage IIb ER/VT positive HER-2/philippe negative currently on Aromasin    I discussed with the patient that in her situation being that she is lymph node positive and a lobular cancer with lymphovascular invasion my recommendation would be to continue 10 years of therapy therefore she will complete in .        Continue potassium         1.  Breast cancer: continue aromasin, labs stable  2 anxiety/depression  Start lexapro 10 mg a day  3.  Dental work: Continue as per her dentist.       4.  Bone health: Calcium 1200 mg a day,    Vitamin D 2000 IUs daily plus Osteo Bi-Flex.  DEXA is normal next one due in     5.  Guillain-Barré syndrome: Chronic foot pain continue management per primary care physician.       Parker Richardson MD  Hematology/Oncology  [facility]      Again, thank you for allowing me to participate in the care of your patient.        Sincerely,        Parker Richardson MD

## 2024-01-12 ENCOUNTER — LAB (OUTPATIENT)
Dept: INFUSION THERAPY | Facility: CLINIC | Age: 64
End: 2024-01-12
Attending: INTERNAL MEDICINE
Payer: MEDICARE

## 2024-01-12 DIAGNOSIS — C50.011 MALIGNANT NEOPLASM OF NIPPLE OF BOTH BREASTS IN FEMALE, ESTROGEN RECEPTOR POSITIVE (H): ICD-10-CM

## 2024-01-12 DIAGNOSIS — C50.012 MALIGNANT NEOPLASM OF NIPPLE OF BOTH BREASTS IN FEMALE, ESTROGEN RECEPTOR POSITIVE (H): ICD-10-CM

## 2024-01-12 DIAGNOSIS — Z79.811 LONG TERM (CURRENT) USE OF AROMATASE INHIBITORS: ICD-10-CM

## 2024-01-12 DIAGNOSIS — Z17.0 MALIGNANT NEOPLASM OF NIPPLE OF BOTH BREASTS IN FEMALE, ESTROGEN RECEPTOR POSITIVE (H): ICD-10-CM

## 2024-01-12 DIAGNOSIS — F41.9 ANXIETY: ICD-10-CM

## 2024-01-12 LAB
ALBUMIN SERPL BCG-MCNC: 4.2 G/DL (ref 3.5–5.2)
ALP SERPL-CCNC: 117 U/L (ref 40–150)
ALT SERPL W P-5'-P-CCNC: 36 U/L (ref 0–50)
ANION GAP SERPL CALCULATED.3IONS-SCNC: 9 MMOL/L (ref 7–15)
AST SERPL W P-5'-P-CCNC: 27 U/L (ref 0–45)
BASOPHILS # BLD AUTO: 0.1 10E3/UL (ref 0–0.2)
BASOPHILS NFR BLD AUTO: 1 %
BILIRUB SERPL-MCNC: 0.7 MG/DL
BUN SERPL-MCNC: 21.7 MG/DL (ref 8–23)
CALCIUM SERPL-MCNC: 9.7 MG/DL (ref 8.8–10.2)
CHLORIDE SERPL-SCNC: 100 MMOL/L (ref 98–107)
CREAT SERPL-MCNC: 0.8 MG/DL (ref 0.51–0.95)
DEPRECATED HCO3 PLAS-SCNC: 30 MMOL/L (ref 22–29)
EGFRCR SERPLBLD CKD-EPI 2021: 81 ML/MIN/1.73M2
EOSINOPHIL # BLD AUTO: 0.4 10E3/UL (ref 0–0.7)
EOSINOPHIL NFR BLD AUTO: 5 %
ERYTHROCYTE [DISTWIDTH] IN BLOOD BY AUTOMATED COUNT: 13.1 % (ref 10–15)
GLUCOSE SERPL-MCNC: 107 MG/DL (ref 70–99)
HCT VFR BLD AUTO: 43.2 % (ref 35–47)
HGB BLD-MCNC: 14.2 G/DL (ref 11.7–15.7)
HOLD SPECIMEN: NORMAL
IMM GRANULOCYTES # BLD: 0.1 10E3/UL
IMM GRANULOCYTES NFR BLD: 1 %
LYMPHOCYTES # BLD AUTO: 2.3 10E3/UL (ref 0.8–5.3)
LYMPHOCYTES NFR BLD AUTO: 30 %
MCH RBC QN AUTO: 29 PG (ref 26.5–33)
MCHC RBC AUTO-ENTMCNC: 32.9 G/DL (ref 31.5–36.5)
MCV RBC AUTO: 88 FL (ref 78–100)
MONOCYTES # BLD AUTO: 0.5 10E3/UL (ref 0–1.3)
MONOCYTES NFR BLD AUTO: 7 %
NEUTROPHILS # BLD AUTO: 4.3 10E3/UL (ref 1.6–8.3)
NEUTROPHILS NFR BLD AUTO: 56 %
NRBC # BLD AUTO: 0 10E3/UL
NRBC BLD AUTO-RTO: 0 /100
PLATELET # BLD AUTO: 231 10E3/UL (ref 150–450)
POTASSIUM SERPL-SCNC: 3.8 MMOL/L (ref 3.4–5.3)
PROT SERPL-MCNC: 7 G/DL (ref 6.4–8.3)
RBC # BLD AUTO: 4.89 10E6/UL (ref 3.8–5.2)
SODIUM SERPL-SCNC: 139 MMOL/L (ref 135–145)
WBC # BLD AUTO: 7.7 10E3/UL (ref 4–11)

## 2024-01-12 PROCEDURE — 86300 IMMUNOASSAY TUMOR CA 15-3: CPT

## 2024-01-12 PROCEDURE — 36415 COLL VENOUS BLD VENIPUNCTURE: CPT

## 2024-01-12 PROCEDURE — 85025 COMPLETE CBC W/AUTO DIFF WBC: CPT

## 2024-01-12 PROCEDURE — 80053 COMPREHEN METABOLIC PANEL: CPT

## 2024-01-12 PROCEDURE — 82378 CARCINOEMBRYONIC ANTIGEN: CPT

## 2024-01-13 LAB
CANCER AG15-3 SERPL-ACNC: 28 U/ML
CEA SERPL-MCNC: 1.6 NG/ML

## 2024-01-17 ENCOUNTER — VIRTUAL VISIT (OUTPATIENT)
Dept: ONCOLOGY | Facility: CLINIC | Age: 64
End: 2024-01-17
Attending: INTERNAL MEDICINE
Payer: MEDICARE

## 2024-01-17 VITALS — BODY MASS INDEX: 34.15 KG/M2 | HEIGHT: 64 IN | WEIGHT: 200 LBS

## 2024-01-17 DIAGNOSIS — C50.012 MALIGNANT NEOPLASM OF NIPPLE OF BOTH BREASTS IN FEMALE, ESTROGEN RECEPTOR POSITIVE (H): ICD-10-CM

## 2024-01-17 DIAGNOSIS — Z79.811 LONG TERM (CURRENT) USE OF AROMATASE INHIBITORS: ICD-10-CM

## 2024-01-17 DIAGNOSIS — Z17.0 MALIGNANT NEOPLASM OF NIPPLE OF BOTH BREASTS IN FEMALE, ESTROGEN RECEPTOR POSITIVE (H): ICD-10-CM

## 2024-01-17 DIAGNOSIS — C50.011 MALIGNANT NEOPLASM OF NIPPLE OF BOTH BREASTS IN FEMALE, ESTROGEN RECEPTOR POSITIVE (H): ICD-10-CM

## 2024-01-17 DIAGNOSIS — F41.9 ANXIETY: ICD-10-CM

## 2024-01-17 PROCEDURE — 99214 OFFICE O/P EST MOD 30 MIN: CPT | Mod: 95 | Performed by: INTERNAL MEDICINE

## 2024-01-17 RX ORDER — AMLODIPINE BESYLATE 5 MG/1
5 TABLET ORAL DAILY
COMMUNITY
Start: 2023-12-27 | End: 2024-12-26

## 2024-01-17 ASSESSMENT — PAIN SCALES - GENERAL: PAINLEVEL: MILD PAIN (2)

## 2024-01-17 NOTE — PROGRESS NOTES
Virtual Visit Details    Type of service:  Video Visit   Video Start Time: 11:38 AM  Video End Time: 1200    Originating Location (pt. Location): Home    Distant Location (provider location):  On-site  Platform used for Video Visit: Handy

## 2024-01-17 NOTE — LETTER
1/17/2024         RE: Jazmin Soler  [address] [facility] [facility] [address]        Dear Colleague,    Thank you for referring your patient, Jazmin Soler, to the [facility] [place]. Please see a copy of my visit note below.    Virtual Visit Details    Type of service:  Video Visit   Video Start Time: 11:38 AM  Video End Time: 1200    Originating Location (pt. Location): Home    Distant Location (provider location):  On-site  Platform used for Video Visit: [facility]    Hematology/Oncology Return patient visit  video    Today's Date:Jan 17, 2024    Reason for Consult: Breast cancer     HISTORY OF PRESENT ILLNESS: Jazmin is a very pleasant 62-year-old patient of mine whom I been seeing for years.  She is transferring care from [place] oncology      1.  Diagnosed with breast cancer T2 invasive lobular carcinoma and invasive ductal carcinoma in May 2016  2.  Oncotype DX low risk of recurrence  3. Mastectomies final tumor was ER positive NE positive HER-2/philippe negative with evidence of lymphovascular invasion 1 out of 2 sentinel lymph nodes were positive for carcinoma measuring 3 mm in size  4.  Total tumor size 4 cm, Oncotype DX score was 13  5.  BRCA testing negative  6.  Letrozole June 2016 to November 2020 changed to Aromasin due to arthralgias  7.  Bone density scan recently shows a T score of -1.2 baseline initially prior to starting was normal  8 Started Aromasin and tolerating it much better    Interval history Jazmin presents for follow-up today.  She has elevated BP, she was started on norvasc. Patient feels that she has gained weight.      REVIEW OF SYSTEMS:   14 point ROS was reviewed and is negative other than as noted above in HPI.       HOME MEDICATIONS:  Current Outpatient Medications   Medication Sig Dispense Refill     amLODIPine (NORVASC) 5 MG tablet Take 5 mg by mouth daily       escitalopram (LEXAPRO) 10 MG tablet Take 1 tablet (10 mg) by  mouth daily 90 tablet 3     exemestane (AROMASIN) 25 MG tablet TAKE 1 TABLET BY MOUTH EVERY DAY 90 tablet 3     ibuprofen (ADVIL,MOTRIN) 600 MG tablet Take 1 tablet (600 mg) by mouth every 6 hours as needed for pain (mild) 30 tablet 0     morphine (MS CONTIN) 15 MG CR tablet Take 15 mg by mouth       oxyCODONE HCl (OXECTA) 5 MG TABA Take 5 mg by mouth as needed       potassium chloride ER (KLOR-CON M) 20 MEQ CR tablet Take 1 tablet (20 mEq) by mouth in the morning and 1 tablet (20 mEq) in the evening. 180 tablet 1     rizatriptan (MAXALT) 10 MG tablet Take 10 mg by mouth at onset of headache May repeat in 2 hours if needed: max 2/day; average number of headaches monthly 1       triamterene-hydrochlorothiazide (MAXZIDE-25) 37.5-25 MG per tablet Take 1 tablet by mouth daily.       VITAMIN D, CHOLECALCIFEROL, PO Take  by mouth.       YUVAFEM 10 MCG TABS vaginal tablet  (Patient not taking: Reported on 7/19/2023)           ALLERGIES:  Allergies   Allergen Reactions     Influenza Virus Vaccine Other (See Comments)     Guillain [place] syndrome         PAST MEDICAL HISTORY:  Past Medical History:   Diagnosis Date     Anxiety      Breast cancer (H) 3/30/2016    Left breast     H/O Guillain-[place] syndrome      Hypertension      Migraines      Peripheral neuropathy     Related to h/o Guillain-[place] syndrome     S/P radiation therapy     6,040 cGy to left chest wall_axilla and 4,500 cGy to left SCV_axilla completed on 8/12/2016 - [facility]         PAST SURGICAL HISTORY:  Past Surgical History:   Procedure Laterality Date     BILATERAL 1ST STAGE BREAST RECONSTRUCTION Bilateral 5/3/2016    Dr. Marry Aldridge     BIOPSY BREAST SEED LOCALIZATION Left 3/30/2016    Procedure: BIOPSY BREAST SEED LOCALIZATION;  Surgeon: Yue Smiley MD;  [facility]: [facility]     BREAST BIOPSY, CORE RT/LT Left 9/2/2015     BREAST BIOPSY, CORE RT/LT Left 3/23/2016     CHOLECYSTECTOMY  1998     MAMMOPLASTY REDUCTION Bilateral 1989      MASTECTOMY SIMPLE BILATERAL Bilateral 5/3/2016     [facility]     OTHER - AXILLARY SENTINEL LYMPH NODE BIOPSY Left 5/3/2016     [facility]     TUBAL LIGATION           SOCIAL HISTORY:  Social History     Socioeconomic History     Marital status:      Spouse name: Not on file     Number of children: Not on file     Years of education: Not on file     Highest education level: Not on file   Occupational History     Not on file   Tobacco Use     Smoking status: Never     Smokeless tobacco: Never   Substance and Sexual Activity     Alcohol use: No     Alcohol/week: 0.0 standard drinks of alcohol     Drug use: No     Sexual activity: Yes     Partners: Male     Birth control/protection: Female Surgical     Comment: Tubal ligation    Other Topics Concern     Parent/sibling w/ CABG, MI or angioplasty before 65F 55M? Not Asked   Social History Narrative     Not on file     Social Determinants of Health     Financial Resource Strain: Not on file   Food Insecurity: Not on file   Transportation Needs: Not on file   Physical Activity: Not on file   Stress: Not on file   Social Connections: Not on file   Interpersonal Safety: Not on file   Housing Stability: Not on file         FAMILY HISTORY:  Family History   Problem Relation Age of Onset     Cancer Mother 65        Breast, living     Cancer Father 55        Melanoma, living     Cardiovascular Maternal Grandmother         Heart attack     Cancer Maternal Grandfather 71        Gallbladder,      Unknown/Adopted Paternal Grandmother         old age      Cardiovascular Paternal Grandfather         heart attack     Cancer Brother 40        Testicular, living     Cancer Other         Breast,      Cancer Maternal Aunt 55        Breast, living (mother's twin sister)     Cancer Maternal Aunt 60        Breast, living     Cancer Other 55        Breast, living     Cancer Other         Breast,      Cancer  "Other         Breast,  (Daughter to above maternal great aunt)     Cancer Other         Breast,  (Daughter to above maternal great aunt)         PHYSICAL EXAM:  Vital signs:  Ht 1.626 m (5' 4\")   Wt 90.7 kg (200 lb)   LMP 2009   BMI 34.33 kg/m     ECO  ECO  GENERAL/CONSTITUTIONAL: No acute distress. Healthy, alert.  EYES: No scleral icterus.  No redness or discharge.    RESPIRATORY: No audible wheeze, cough, or visible cyanosis.  No visible retractions or increased work of breathing.  Able to speak fully in complete sentences.  MUSCULOSKELETAL: Normal range of motion.  NEUROLOGIC: Alert, oriented, answers questions appropriately. No tremor. Mentation intact and speech normal  INTEGUMENTARY: No jaundice.  No obvious rash or skin lesions.  PSYCHIATRIC:  Mentation appears normal, affect normal/bright, judgement and insight intact, normal speech and appearance well-groomed.    The rest of a comprehensive physical exam is deferred due to public health emergency video visit restrictions.     LABS:  Labs noted and reviewed all stable except for CA 15-3 which came back at 28    PATHOLOGY:    As per HPI    IMAGING:  None    ASSESSMENT/PLAN:  Jazmin is a very pleasant 62-year-old female who has a history of invasive lobular carcinoma stage IIb ER/DE positive HER-2/philippe negative currently on Aromasin    I discussed with the patient that in her situation being that she is lymph node positive and a lobular cancer with lymphovascular invasion my recommendation would be to continue 10 years of therapy therefore she will complete in .        Continue potassium      1.  Breast cancer: continue aromasin, labs stable  2 anxiety/depression  continue lexapro 10 mg a day  3.  Dental work: Continue as per her dentist.       4.  Bone health: Calcium 1200 mg a day,    Vitamin D 2000 IUs daily plus Osteo Bi-Flex.  DEXA is normal next one due in     5.  Guillain-Barré syndrome: Chronic foot pain " continue management per primary care physician.     6 weight gain: metformin 500 mg a day  7 hypertension: improved,Continue norvasc 5 mg a day per primary care physician    See me in 6 months     Parker Richardson MD  Hematology/Oncology  [facility]      Again, thank you for allowing me to participate in the care of your patient.        Sincerely,        aPrker Richardson MD

## 2024-01-17 NOTE — NURSING NOTE
Is the patient currently in the state of [place]? YES    Visit mode:VIDEO    If the visit is dropped, the patient can be reconnected by: VIDEO VISIT: Text to cell phone:   Telephone Information:   Mobile [phone]       Will anyone else be joining the visit? NO  (If patient encounters technical issues they should call [phone])    How would you like to obtain your AVS? MyChart    Are changes needed to the allergy or medication list? Yes Pt states she is also taking Osteo bi flex joint health (over the counter), a stool softener 2 a day at night, melatonin gummies 2 tablets a day and a multivitamin (over the counter).    Reason for visit: RECHECK    Zachary HASTINGS

## 2024-01-17 NOTE — LETTER
1/17/2024         RE: Jazmin Soler  [address] [place] [place] [facility] [address]        Dear Colleague,    Thank you for referring your patient, Jazmin Soler, to the [facility] [place]. Please see a copy of my visit note below.    Virtual Visit Details    Type of service:  Video Visit   Video Start Time: 11:38 AM  Video End Time: 1200    Originating Location (pt. Location): Home    Distant Location (provider location):  On-site  Platform used for Video Visit: [facility]    Hematology/Oncology Return patient visit  video    Today's Date:Jan 17, 2024    Reason for Consult: Breast cancer     HISTORY OF PRESENT ILLNESS: Jazmin is a very pleasant 62-year-old patient of mine whom I been seeing for years.  She is transferring care from [place] [facility]      1.  Diagnosed with breast cancer T2 invasive lobular carcinoma and invasive ductal carcinoma in May 2016  2.  Oncotype DX low risk of recurrence  3. Mastectomies final tumor was ER positive [place] positive HER-2/philippe negative with evidence of lymphovascular invasion 1 out of 2 sentinel lymph nodes were positive for carcinoma measuring 3 mm in size  4.  Total tumor size 4 cm, Oncotype DX score was 13  5.  BRCA testing negative  6.  Letrozole June 2016 to November 2020 changed to Aromasin due to arthralgias  7.  Bone density scan recently shows a T score of -1.2 baseline initially prior to starting was normal  8 Started Aromasin and tolerating it much better    Interval history Jazmin presents for follow-up today.  She has elevated BP, she was started on norvasc. Patient feels that she has gained weight.      REVIEW OF SYSTEMS:   14 point ROS was reviewed and is negative other than as noted above in HPI.       HOME MEDICATIONS:  Current Outpatient Medications   Medication Sig Dispense Refill     amLODIPine (NORVASC) 5 MG tablet Take 5 mg by mouth daily       escitalopram (LEXAPRO) 10 MG tablet Take 1 tablet (10 mg) by  mouth daily 90 tablet 3     exemestane (AROMASIN) 25 MG tablet TAKE 1 TABLET BY MOUTH EVERY DAY 90 tablet 3     ibuprofen (ADVIL,MOTRIN) 600 MG tablet Take 1 tablet (600 mg) by mouth every 6 hours as needed for pain (mild) 30 tablet 0     morphine (MS CONTIN) 15 MG CR tablet Take 15 mg by mouth       oxyCODONE HCl (OXECTA) 5 MG TABA Take 5 mg by mouth as needed       potassium chloride ER (KLOR-CON M) 20 MEQ CR tablet Take 1 tablet (20 mEq) by mouth in the morning and 1 tablet (20 mEq) in the evening. 180 tablet 1     rizatriptan (MAXALT) 10 MG tablet Take 10 mg by mouth at onset of headache May repeat in 2 hours if needed: max 2/day; average number of headaches monthly 1       triamterene-hydrochlorothiazide (MAXZIDE-25) 37.5-25 MG per tablet Take 1 tablet by mouth daily.       VITAMIN D, CHOLECALCIFEROL, PO Take  by mouth.       YUVAFEM 10 MCG TABS vaginal tablet  (Patient not taking: Reported on 7/19/2023)           ALLERGIES:  Allergies   Allergen Reactions     Influenza Virus Vaccine Other (See Comments)     Guillain [place] syndrome         PAST MEDICAL HISTORY:  Past Medical History:   Diagnosis Date     Anxiety      Breast cancer (H) 3/30/2016    Left breast     H/O Guillain-[place] syndrome      Hypertension      Migraines      Peripheral neuropathy     Related to h/o Guillain-[place] syndrome     S/P radiation therapy     6,040 cGy to left chest wall_axilla and 4,500 cGy to left SCV_axilla completed on 8/12/2016 - [facility]         PAST SURGICAL HISTORY:  Past Surgical History:   Procedure Laterality Date     BILATERAL 1ST STAGE BREAST RECONSTRUCTION Bilateral 5/3/2016    Dr. Marry Aldridge     BIOPSY BREAST SEED LOCALIZATION Left 3/30/2016    Procedure: BIOPSY BREAST SEED LOCALIZATION;  Surgeon: Yue Smiley MD;  Location: [facility]     BREAST BIOPSY, CORE RT/LT Left 9/2/2015     BREAST BIOPSY, CORE RT/LT Left 3/23/2016     CHOLECYSTECTOMY  1998     MAMMOPLASTY REDUCTION Bilateral 1989      MASTECTOMY SIMPLE BILATERAL Bilateral 5/3/2016     [facility]     OTHER - AXILLARY SENTINEL LYMPH NODE BIOPSY Left 5/3/2016     [facility]     TUBAL LIGATION           SOCIAL HISTORY:  Social History     Socioeconomic History     Marital status:      Spouse name: Not on file     Number of children: Not on file     Years of education: Not on file     Highest education level: Not on file   Occupational History     Not on file   Tobacco Use     Smoking status: Never     Smokeless tobacco: Never   Substance and Sexual Activity     Alcohol use: No     Alcohol/week: 0.0 standard drinks of alcohol     Drug use: No     Sexual activity: Yes     Partners: Male     Birth control/protection: Female Surgical     Comment: Tubal ligation    Other Topics Concern     Parent/sibling w/ CABG, MI or angioplasty before 65F 55M? Not Asked   Social History Narrative     Not on file     Social Determinants of Health     Financial Resource Strain: Not on file   Food Insecurity: Not on file   Transportation Needs: Not on file   Physical Activity: Not on file   Stress: Not on file   Social Connections: Not on file   Interpersonal Safety: Not on file   Housing Stability: Not on file         FAMILY HISTORY:  Family History   Problem Relation Age of Onset     Cancer Mother 65        Breast, living     Cancer Father 55        Melanoma, living     Cardiovascular Maternal Grandmother         Heart attack     Cancer Maternal Grandfather 71        Gallbladder,      Unknown/Adopted Paternal Grandmother         old age      Cardiovascular Paternal Grandfather         heart attack     Cancer Brother 40        Testicular, living     Cancer Other         Breast,      Cancer Maternal Aunt 55        Breast, living (mother's twin sister)     Cancer Maternal Aunt 60        Breast, living     Cancer Other 55        Breast, living     Cancer Other         Breast,      Cancer  "Other         Breast,  (Daughter to above maternal great aunt)     Cancer Other         Breast,  (Daughter to above maternal great aunt)         PHYSICAL EXAM:  Vital signs:  Ht 1.626 m (5' 4\")   Wt 90.7 kg (200 lb)   LMP 2009   BMI 34.33 kg/m     ECO  ECO  GENERAL/CONSTITUTIONAL: No acute distress. Healthy, alert.  EYES: No scleral icterus.  No redness or discharge.    RESPIRATORY: No audible wheeze, cough, or visible cyanosis.  No visible retractions or increased work of breathing.  Able to speak fully in complete sentences.  MUSCULOSKELETAL: Normal range of motion.  NEUROLOGIC: Alert, oriented, answers questions appropriately. No tremor. Mentation intact and speech normal  INTEGUMENTARY: No jaundice.  No obvious rash or skin lesions.  PSYCHIATRIC:  Mentation appears normal, affect normal/bright, judgement and insight intact, normal speech and appearance well-groomed.    The rest of a comprehensive physical exam is deferred due to public health emergency video visit restrictions.     LABS:  Labs noted and reviewed all stable except for CA 15-3 which came back at 28    PATHOLOGY:    As per HPI    IMAGING:  None    ASSESSMENT/PLAN:  Jazmin is a very pleasant 62-year-old female who has a history of invasive lobular carcinoma stage IIb ER/ME positive HER-2/philippe negative currently on Aromasin    I discussed with the patient that in her situation being that she is lymph node positive and a lobular cancer with lymphovascular invasion my recommendation would be to continue 10 years of therapy therefore she will complete in .        Continue potassium      1.  Breast cancer: continue aromasin, labs stable  2 anxiety/depression  continue lexapro 10 mg a day  3.  Dental work: Continue as per her dentist.       4.  Bone health: Calcium 1200 mg a day,    Vitamin D 2000 IUs daily plus Osteo Bi-Flex.  DEXA is normal next one due in     5.  Guillain-Barré syndrome: Chronic foot pain " continue management per primary care physician.     6 weight gain: metformin 500 mg a day  7 hypertension: improved,Continue norvasc 5 mg a day per primary care physician    See me in 6 months     Parker Richardson MD  Hematology/Oncology  [facility]      Again, thank you for allowing me to participate in the care of your patient.        Sincerely,        Parker Richardson MD

## 2024-01-17 NOTE — PROGRESS NOTES
[facility]    Hematology/Oncology Return patient visit  video    Today's Date:Jan 17, 2024    Reason for Consult: Breast cancer     HISTORY OF PRESENT ILLNESS: Jazmin is a very pleasant 62-year-old patient of mine whom I been seeing for years.  She is transferring care from [place] oncology      1.  Diagnosed with breast cancer T2 invasive lobular carcinoma and invasive ductal carcinoma in May 2016  2.  Oncotype DX low risk of recurrence  3. Mastectomies final tumor was ER positive NM positive HER-2/philippe negative with evidence of lymphovascular invasion 1 out of 2 sentinel lymph nodes were positive for carcinoma measuring 3 mm in size  4.  Total tumor size 4 cm, Oncotype DX score was 13  5.  BRCA testing negative  6.  Letrozole June 2016 to November 2020 changed to Aromasin due to arthralgias  7.  Bone density scan recently shows a T score of -1.2 baseline initially prior to starting was normal  8 Started Aromasin and tolerating it much better    Interval history Jazmin presents for follow-up today.  She has elevated BP, she was started on norvasc. Patient feels that she has gained weight.      REVIEW OF SYSTEMS:   14 point ROS was reviewed and is negative other than as noted above in HPI.       HOME MEDICATIONS:  Current Outpatient Medications   Medication Sig Dispense Refill    amLODIPine (NORVASC) 5 MG tablet Take 5 mg by mouth daily      escitalopram (LEXAPRO) 10 MG tablet Take 1 tablet (10 mg) by mouth daily 90 tablet 3    exemestane (AROMASIN) 25 MG tablet TAKE 1 TABLET BY MOUTH EVERY DAY 90 tablet 3    ibuprofen (ADVIL,MOTRIN) 600 MG tablet Take 1 tablet (600 mg) by mouth every 6 hours as needed for pain (mild) 30 tablet 0    morphine (MS CONTIN) 15 MG CR tablet Take 15 mg by mouth      oxyCODONE HCl (OXECTA) 5 MG TABA Take 5 mg by mouth as needed      potassium chloride ER (KLOR-CON M) 20 MEQ CR tablet Take 1 tablet (20 mEq) by mouth in the morning and 1 tablet (20 mEq) in the  evening. 180 tablet 1    rizatriptan (MAXALT) 10 MG tablet Take 10 mg by mouth at onset of headache May repeat in 2 hours if needed: max 2/day; average number of headaches monthly 1      triamterene-hydrochlorothiazide (MAXZIDE-25) 37.5-25 MG per tablet Take 1 tablet by mouth daily.      VITAMIN D, CHOLECALCIFEROL, PO Take  by mouth.      YUVAFEM 10 MCG TABS vaginal tablet  (Patient not taking: Reported on 7/19/2023)           ALLERGIES:  Allergies   Allergen Reactions    Influenza Virus Vaccine Other (See Comments)     Guillain [place] syndrome         PAST MEDICAL HISTORY:  Past Medical History:   Diagnosis Date    Anxiety     Breast cancer (H) 3/30/2016    Left breast    H/O Guillain-[place] syndrome     Hypertension     Migraines     Peripheral neuropathy     Related to h/o Guillain-[place] syndrome    S/P radiation therapy     6,040 cGy to left chest wall_axilla and 4,500 cGy to left SCV_axilla completed on 8/12/2016 - [facility]         PAST SURGICAL HISTORY:  Past Surgical History:   Procedure Laterality Date    BILATERAL 1ST STAGE BREAST RECONSTRUCTION Bilateral 5/3/2016    Dr. Marry Aldridge    BIOPSY BREAST SEED LOCALIZATION Left 3/30/2016    Procedure: BIOPSY BREAST SEED LOCALIZATION;  Surgeon: Yue Smiley MD;  Location: [facility]    BREAST BIOPSY, CORE RT/LT Left 9/2/2015    BREAST BIOPSY, CORE RT/LT Left 3/23/2016    CHOLECYSTECTOMY  1998    MAMMOPLASTY REDUCTION Bilateral 1989    MASTECTOMY SIMPLE BILATERAL Bilateral 5/3/2016     [facility]    OTHER - AXILLARY SENTINEL LYMPH NODE BIOPSY Left 5/3/2016     [facility]    TUBAL LIGATION  1998         SOCIAL HISTORY:  Social History     Socioeconomic History    Marital status:      Spouse name: Not on file    Number of children: Not on file    Years of education: Not on file    Highest education level: Not on file   Occupational History    Not on file   Tobacco Use    Smoking status: Never     "Smokeless tobacco: Never   Substance and Sexual Activity    Alcohol use: No     Alcohol/week: 0.0 standard drinks of alcohol    Drug use: No    Sexual activity: Yes     Partners: Male     Birth control/protection: Female Surgical     Comment: Tubal ligation 1998   Other Topics Concern    Parent/sibling w/ CABG, MI or angioplasty before 65F 55M? Not Asked   Social History Narrative    Not on file     Social Determinants of Health     Financial Resource Strain: Not on file   Food Insecurity: Not on file   Transportation Needs: Not on file   Physical Activity: Not on file   Stress: Not on file   Social Connections: Not on file   Interpersonal Safety: Not on file   Housing Stability: Not on file         FAMILY HISTORY:  Family History   Problem Relation Age of Onset    Cancer Mother 65        Breast, living    Cancer Father 55        Melanoma, living    Cardiovascular Maternal Grandmother         Heart attack    Cancer Maternal Grandfather 71        Gallbladder,     Unknown/Adopted Paternal Grandmother         old age     Cardiovascular Paternal Grandfather         heart attack    Cancer Brother 40        Testicular, living    Cancer Other         Breast,     Cancer Maternal Aunt 55        Breast, living (mother's twin sister)    Cancer Maternal Aunt 60        Breast, living    Cancer Other 55        Breast, living    Cancer Other         Breast,     Cancer Other         Breast,  (Daughter to above maternal great aunt)    Cancer Other         Breast,  (Daughter to above maternal great aunt)         PHYSICAL EXAM:  Vital signs:  Ht 1.626 m (5' 4\")   Wt 90.7 kg (200 lb)   LMP 2009   BMI 34.33 kg/m     ECO  ECO  GENERAL/CONSTITUTIONAL: No acute distress. Healthy, alert.  EYES: No scleral icterus.  No redness or discharge.    RESPIRATORY: No audible wheeze, cough, or visible cyanosis.  No visible retractions or increased work of breathing.  Able to speak fully in " complete sentences.  MUSCULOSKELETAL: Normal range of motion.  NEUROLOGIC: Alert, oriented, answers questions appropriately. No tremor. Mentation intact and speech normal  INTEGUMENTARY: No jaundice.  No obvious rash or skin lesions.  PSYCHIATRIC:  Mentation appears normal, affect normal/bright, judgement and insight intact, normal speech and appearance well-groomed.    The rest of a comprehensive physical exam is deferred due to public health emergency video visit restrictions.     LABS:  Labs noted and reviewed all stable except for CA 15-3 which came back at 28    PATHOLOGY:    As per HPI    IMAGING:  None    ASSESSMENT/PLAN:  Jazmin is a very pleasant 62-year-old female who has a history of invasive lobular carcinoma stage IIb ER/[place] positive HER-2/philippe negative currently on Aromasin    I discussed with the patient that in her situation being that she is lymph node positive and a lobular cancer with lymphovascular invasion my recommendation would be to continue 10 years of therapy therefore she will complete in 2026.        Continue potassium      1.  Breast cancer: continue aromasin, labs stable  2 anxiety/depression  continue lexapro 10 mg a day  3.  Dental work: Continue as per her dentist.       4.  Bone health: Calcium 1200 mg a day,    Vitamin D 2000 IUs daily plus Osteo Bi-Flex.  DEXA is normal next one due in 2025    5.  Guillain-Barré syndrome: Chronic foot pain continue management per primary care physician.     6 weight gain: metformin 500 mg a day  7 hypertension: improved,Continue norvasc 5 mg a day per primary care physician    See me in 6 months     Parker Richardson MD  Hematology/Oncology  [facility]

## 2024-01-23 ENCOUNTER — TELEPHONE (OUTPATIENT)
Dept: ONCOLOGY | Facility: CLINIC | Age: 64
End: 2024-01-23
Payer: MEDICARE

## 2024-01-23 NOTE — PROGRESS NOTES
Received positive distress screen regarding patient's concern for current weight.  Called and spoke with patient.  Patient interested in weight loss and will verify insurance coverage.  Patient will call back with update.  Await return call from patient.     Stalin Deluna, RD, LD  Clinical Dietitian  [facility]  [phone] (direct)

## 2024-03-03 DIAGNOSIS — F41.9 ANXIETY: ICD-10-CM

## 2024-03-04 RX ORDER — ESCITALOPRAM OXALATE 10 MG/1
10 TABLET ORAL DAILY
Qty: 90 TABLET | Refills: 3 | Status: SHIPPED | OUTPATIENT
Start: 2024-03-04

## 2024-04-09 DIAGNOSIS — C50.012 MALIGNANT NEOPLASM OF NIPPLE OF BOTH BREASTS IN FEMALE, ESTROGEN RECEPTOR POSITIVE (H): ICD-10-CM

## 2024-04-09 DIAGNOSIS — F41.9 ANXIETY: ICD-10-CM

## 2024-04-09 DIAGNOSIS — Z17.0 MALIGNANT NEOPLASM OF NIPPLE OF BOTH BREASTS IN FEMALE, ESTROGEN RECEPTOR POSITIVE (H): ICD-10-CM

## 2024-04-09 DIAGNOSIS — Z79.811 LONG TERM (CURRENT) USE OF AROMATASE INHIBITORS: ICD-10-CM

## 2024-04-09 DIAGNOSIS — C50.011 MALIGNANT NEOPLASM OF NIPPLE OF BOTH BREASTS IN FEMALE, ESTROGEN RECEPTOR POSITIVE (H): ICD-10-CM

## 2024-04-17 DIAGNOSIS — Z79.811 LONG TERM (CURRENT) USE OF AROMATASE INHIBITORS: ICD-10-CM

## 2024-04-17 DIAGNOSIS — Z17.0 MALIGNANT NEOPLASM OF NIPPLE OF BOTH BREASTS IN FEMALE, ESTROGEN RECEPTOR POSITIVE (H): Primary | ICD-10-CM

## 2024-04-17 DIAGNOSIS — C50.012 MALIGNANT NEOPLASM OF NIPPLE OF BOTH BREASTS IN FEMALE, ESTROGEN RECEPTOR POSITIVE (H): Primary | ICD-10-CM

## 2024-04-17 DIAGNOSIS — C50.011 MALIGNANT NEOPLASM OF NIPPLE OF BOTH BREASTS IN FEMALE, ESTROGEN RECEPTOR POSITIVE (H): Primary | ICD-10-CM

## 2024-04-17 DIAGNOSIS — R63.5 WEIGHT GAIN: ICD-10-CM

## 2024-05-05 DIAGNOSIS — C50.919 BREAST CANCER (H): ICD-10-CM

## 2024-05-07 RX ORDER — EXEMESTANE 25 MG/1
TABLET ORAL
Qty: 90 TABLET | Refills: 3 | Status: SHIPPED | OUTPATIENT
Start: 2024-05-07

## 2024-06-20 ENCOUNTER — DOCUMENTATION ONLY (OUTPATIENT)
Dept: ONCOLOGY | Facility: CLINIC | Age: 64
End: 2024-06-20

## 2024-06-20 NOTE — PROGRESS NOTES
Jazmin Soler has an upcoming lab appointment:    Future Appointments   Date Time Provider Department Center   2024 11:45 AM [facility] [place]   2024 11:20 AM Parker Richardson MD [facility]     Patient is scheduled for the following lab(s): Pt is requesting  labs form [facility] with  orders in chart, Please renter or edit the date of the labs closer to her appt for labs so we are able to draw her.    There is no order available. Please review and place either future orders or HMPO (Review of Health Maintenance Protocol Orders), as appropriate.    Health Maintenance Due   Topic    ANNUAL REVIEW OF HM ORDERS     HEPATITIS C SCREENING     LIPID      Thank you, Zhane Howe

## 2024-06-21 DIAGNOSIS — C50.919 BREAST CANCER (H): ICD-10-CM

## 2024-06-21 DIAGNOSIS — Z80.3 FAMILY HISTORY OF BREAST CANCER: Primary | ICD-10-CM

## 2024-06-26 ENCOUNTER — LAB (OUTPATIENT)
Dept: LAB | Facility: CLINIC | Age: 64
End: 2024-06-26
Payer: MEDICARE

## 2024-06-26 DIAGNOSIS — C50.011 MALIGNANT NEOPLASM OF NIPPLE OF BOTH BREASTS IN FEMALE, ESTROGEN RECEPTOR POSITIVE (H): ICD-10-CM

## 2024-06-26 DIAGNOSIS — C50.919 BREAST CANCER (H): ICD-10-CM

## 2024-06-26 DIAGNOSIS — F41.9 ANXIETY: ICD-10-CM

## 2024-06-26 DIAGNOSIS — C50.012 MALIGNANT NEOPLASM OF NIPPLE OF BOTH BREASTS IN FEMALE, ESTROGEN RECEPTOR POSITIVE (H): ICD-10-CM

## 2024-06-26 DIAGNOSIS — Z79.811 LONG TERM (CURRENT) USE OF AROMATASE INHIBITORS: ICD-10-CM

## 2024-06-26 DIAGNOSIS — Z17.0 MALIGNANT NEOPLASM OF NIPPLE OF BOTH BREASTS IN FEMALE, ESTROGEN RECEPTOR POSITIVE (H): ICD-10-CM

## 2024-06-26 LAB
ALBUMIN SERPL BCG-MCNC: 4.1 G/DL (ref 3.5–5.2)
ALP SERPL-CCNC: 114 U/L (ref 40–150)
ALT SERPL W P-5'-P-CCNC: 32 U/L (ref 0–50)
ANION GAP SERPL CALCULATED.3IONS-SCNC: 11 MMOL/L (ref 7–15)
AST SERPL W P-5'-P-CCNC: 33 U/L (ref 0–45)
BASOPHILS # BLD AUTO: 0.1 10E3/UL (ref 0–0.2)
BASOPHILS NFR BLD AUTO: 1 %
BILIRUB SERPL-MCNC: 1.2 MG/DL
BUN SERPL-MCNC: 13.1 MG/DL (ref 8–23)
CALCIUM SERPL-MCNC: 9.4 MG/DL (ref 8.8–10.2)
CHLORIDE SERPL-SCNC: 100 MMOL/L (ref 98–107)
CREAT SERPL-MCNC: 0.88 MG/DL (ref 0.51–0.95)
DEPRECATED HCO3 PLAS-SCNC: 29 MMOL/L (ref 22–29)
EGFRCR SERPLBLD CKD-EPI 2021: 72 ML/MIN/1.73M2
EOSINOPHIL # BLD AUTO: 0.4 10E3/UL (ref 0–0.7)
EOSINOPHIL NFR BLD AUTO: 5 %
ERYTHROCYTE [DISTWIDTH] IN BLOOD BY AUTOMATED COUNT: 13.3 % (ref 10–15)
GLUCOSE SERPL-MCNC: 118 MG/DL (ref 70–99)
HCT VFR BLD AUTO: 41.9 % (ref 35–47)
HGB BLD-MCNC: 13.8 G/DL (ref 11.7–15.7)
IMM GRANULOCYTES # BLD: 0 10E3/UL
IMM GRANULOCYTES NFR BLD: 1 %
LYMPHOCYTES # BLD AUTO: 2.2 10E3/UL (ref 0.8–5.3)
LYMPHOCYTES NFR BLD AUTO: 33 %
MCH RBC QN AUTO: 28.8 PG (ref 26.5–33)
MCHC RBC AUTO-ENTMCNC: 32.9 G/DL (ref 31.5–36.5)
MCV RBC AUTO: 88 FL (ref 78–100)
MONOCYTES # BLD AUTO: 0.7 10E3/UL (ref 0–1.3)
MONOCYTES NFR BLD AUTO: 11 %
NEUTROPHILS # BLD AUTO: 3.2 10E3/UL (ref 1.6–8.3)
NEUTROPHILS NFR BLD AUTO: 49 %
NRBC # BLD AUTO: 0 10E3/UL
NRBC BLD AUTO-RTO: 0 /100
PLATELET # BLD AUTO: 231 10E3/UL (ref 150–450)
POTASSIUM SERPL-SCNC: 3.4 MMOL/L (ref 3.4–5.3)
PROT SERPL-MCNC: 6.9 G/DL (ref 6.4–8.3)
RBC # BLD AUTO: 4.79 10E6/UL (ref 3.8–5.2)
SODIUM SERPL-SCNC: 140 MMOL/L (ref 135–145)
WBC # BLD AUTO: 6.5 10E3/UL (ref 4–11)

## 2024-06-26 PROCEDURE — 85025 COMPLETE CBC W/AUTO DIFF WBC: CPT | Performed by: PATHOLOGY

## 2024-06-26 PROCEDURE — 80053 COMPREHEN METABOLIC PANEL: CPT | Performed by: PATHOLOGY

## 2024-06-26 PROCEDURE — 82378 CARCINOEMBRYONIC ANTIGEN: CPT | Performed by: PATHOLOGY

## 2024-06-26 PROCEDURE — 86300 IMMUNOASSAY TUMOR CA 15-3: CPT | Performed by: PATHOLOGY

## 2024-06-26 PROCEDURE — 36415 COLL VENOUS BLD VENIPUNCTURE: CPT | Performed by: PATHOLOGY

## 2024-06-27 LAB
CANCER AG15-3 SERPL-ACNC: 29 U/ML
CEA SERPL-MCNC: 1.7 NG/ML

## 2024-07-17 ENCOUNTER — ONCOLOGY VISIT (OUTPATIENT)
Dept: ONCOLOGY | Facility: CLINIC | Age: 64
End: 2024-07-17
Attending: INTERNAL MEDICINE
Payer: MEDICARE

## 2024-07-17 VITALS
OXYGEN SATURATION: 96 % | DIASTOLIC BLOOD PRESSURE: 77 MMHG | WEIGHT: 230.2 LBS | BODY MASS INDEX: 39.51 KG/M2 | SYSTOLIC BLOOD PRESSURE: 133 MMHG | TEMPERATURE: 98.1 F | HEART RATE: 79 BPM | RESPIRATION RATE: 14 BRPM

## 2024-07-17 DIAGNOSIS — E66.01 CLASS 3 SEVERE OBESITY WITH BODY MASS INDEX (BMI) OF 45.0 TO 49.9 IN ADULT, UNSPECIFIED OBESITY TYPE, UNSPECIFIED WHETHER SERIOUS COMORBIDITY PRESENT (H): Primary | ICD-10-CM

## 2024-07-17 DIAGNOSIS — E66.813 CLASS 3 SEVERE OBESITY WITH BODY MASS INDEX (BMI) OF 45.0 TO 49.9 IN ADULT, UNSPECIFIED OBESITY TYPE, UNSPECIFIED WHETHER SERIOUS COMORBIDITY PRESENT (H): Primary | ICD-10-CM

## 2024-07-17 DIAGNOSIS — C50.511 MALIGNANT NEOPLASM OF LOWER-OUTER QUADRANT OF RIGHT BREAST OF FEMALE, ESTROGEN RECEPTOR POSITIVE (H): ICD-10-CM

## 2024-07-17 DIAGNOSIS — Z17.0 MALIGNANT NEOPLASM OF LOWER-OUTER QUADRANT OF RIGHT BREAST OF FEMALE, ESTROGEN RECEPTOR POSITIVE (H): ICD-10-CM

## 2024-07-17 PROCEDURE — 99214 OFFICE O/P EST MOD 30 MIN: CPT | Performed by: INTERNAL MEDICINE

## 2024-07-17 PROCEDURE — G0463 HOSPITAL OUTPT CLINIC VISIT: HCPCS | Performed by: INTERNAL MEDICINE

## 2024-07-17 ASSESSMENT — PAIN SCALES - GENERAL: PAINLEVEL: NO PAIN (0)

## 2024-07-17 NOTE — PROGRESS NOTES
[facility]    Hematology/Oncology Return patient visit  video    Today's Date:July 17, 2024    Reason for Consult: Breast cancer     HISTORY OF PRESENT ILLNESS: Jazmin is a very pleasant 62-year-old patient of mine whom I been seeing for years.  She is transferring care from [place] [facility]      1.  Diagnosed with breast cancer T2 invasive lobular carcinoma and invasive ductal carcinoma in May 2016  2.  Oncotype DX low risk of recurrence  3. Mastectomies final tumor was ER positive [place] positive HER-2/philippe negative with evidence of lymphovascular invasion 1 out of 2 sentinel lymph nodes were positive for carcinoma measuring 3 mm in size  4.  Total tumor size 4 cm, Oncotype DX score was 13  5.  BRCA testing negative  6.  Letrozole June 2016 to November 2020 changed to Aromasin due to arthralgias  7.  Bone density scan recently shows a T score of -1.2 baseline initially prior to starting was normal  8 Started Aromasin and tolerating it much better    Interval history Jazmin presents for follow-up today.   She is doing well. Frustrated with her weight. Metformin 500 mg bid did not work well for her, she is eating more stressed after  passing away    REVIEW OF SYSTEMS:   14 point ROS was reviewed and is negative other than as noted above in HPI.       HOME MEDICATIONS:  Current Outpatient Medications   Medication Sig Dispense Refill    amLODIPine (NORVASC) 5 MG tablet Take 5 mg by mouth daily      escitalopram (LEXAPRO) 10 MG tablet TAKE 1 TABLET (10 MG) BY MOUTH DAILY. 90 tablet 3    exemestane (AROMASIN) 25 MG tablet TAKE 1 TABLET BY MOUTH EVERY DAY 90 tablet 3    ibuprofen (ADVIL,MOTRIN) 600 MG tablet Take 1 tablet (600 mg) by mouth every 6 hours as needed for pain (mild) 30 tablet 0    metFORMIN (GLUCOPHAGE) 500 MG tablet TAKE 1 TABLET BY MOUTH TWICE A DAY WITH MEALS 180 tablet 2    morphine (MS CONTIN) 15 MG CR tablet Take 15 mg by mouth      oxyCODONE HCl (OXECTA) 5 MG TABA Take 5 mg  by mouth as needed      potassium chloride ER (KLOR-CON M) 20 MEQ CR tablet Take 1 tablet (20 mEq) by mouth in the morning and 1 tablet (20 mEq) in the evening. 180 tablet 1    rizatriptan (MAXALT) 10 MG tablet Take 10 mg by mouth at onset of headache May repeat in 2 hours if needed: max 2/day; average number of headaches monthly 1      triamterene-hydrochlorothiazide (MAXZIDE-25) 37.5-25 MG per tablet Take 1 tablet by mouth daily.      VITAMIN D, CHOLECALCIFEROL, PO Take  by mouth.      YUVAFEM 10 MCG TABS vaginal tablet            ALLERGIES:  Allergies   Allergen Reactions    Influenza Virus Vaccine Other (See Comments)     Guillain Ackerman syndrome         PAST MEDICAL HISTORY:  Past Medical History:   Diagnosis Date    Anxiety     Breast cancer (H) 3/30/2016    Left breast    H/O Guillain-Ackerman syndrome     Hypertension     Migraines     Peripheral neuropathy     Related to h/o Guillain-Ackerman syndrome    S/P radiation therapy     6,040 cGy to left chest wall_axilla and 4,500 cGy to left SCV_axilla completed on 8/12/2016 [facility]         PAST SURGICAL HISTORY:  Past Surgical History:   Procedure Laterality Date    BILATERAL 1ST STAGE BREAST RECONSTRUCTION Bilateral 5/3/2016    Dr. Marry Aldridge    BIOPSY BREAST SEED LOCALIZATION Left 3/30/2016    Procedure: BIOPSY BREAST SEED LOCALIZATION;  Surgeon: Yue Smiley MD;  Location: [facility]    BREAST BIOPSY, CORE RT/LT Left 9/2/2015    BREAST BIOPSY, CORE RT/LT Left 3/23/2016    CHOLECYSTECTOMY  1998    MAMMOPLASTY REDUCTION Bilateral 1989    MASTECTOMY SIMPLE BILATERAL Bilateral 5/3/2016     [facility]    OTHER - AXILLARY SENTINEL LYMPH NODE BIOPSY Left 5/3/2016     [facility]    TUBAL LIGATION  1998         SOCIAL HISTORY:  Social History     Socioeconomic History    Marital status:      Spouse name: Not on file    Number of children: Not on file    Years of education: Not on file    Highest  education level: Not on file   Occupational History    Not on file   Tobacco Use    Smoking status: Never    Smokeless tobacco: Never   Substance and Sexual Activity    Alcohol use: No     Alcohol/week: 0.0 standard drinks of alcohol    Drug use: No    Sexual activity: Yes     Partners: Male     Birth control/protection: Female Surgical     Comment: Tubal ligation    Other Topics Concern    Parent/sibling w/ CABG, MI or angioplasty before 65F 55M? Not Asked   Social History Narrative    Not on file     Social Determinants of Health     Financial Resource Strain: Not on file   Food Insecurity: Not on file   Transportation Needs: Not on file   Physical Activity: Not on file   Stress: Not on file   Social Connections: Not on file   Interpersonal Safety: Not on file   Housing Stability: Not on file         FAMILY HISTORY:  Family History   Problem Relation Age of Onset    Cancer Mother 65        Breast, living    Cancer Father 55        Melanoma, living    Cardiovascular Maternal Grandmother         Heart attack    Cancer Maternal Grandfather 71        Gallbladder,     Unknown/Adopted Paternal Grandmother         old age     Cardiovascular Paternal Grandfather         heart attack    Cancer Brother 40        Testicular, living    Cancer Other         Breast,     Cancer Maternal Aunt 55        Breast, living (mother's twin sister)    Cancer Maternal Aunt 60        Breast, living    Cancer Other 55        Breast, living    Cancer Other         Breast,     Cancer Other         Breast,  (Daughter to above maternal great aunt)    Cancer Other         Breast,  (Daughter to above maternal great aunt)         PHYSICAL EXAM:  Vital signs:  /77   Pulse 79   Temp 98.1  F (36.7  C) (Oral)   Resp 14   Wt 104.4 kg (230 lb 3.2 oz)   LMP 2009   SpO2 96%   BMI 39.51 kg/m     ECO  S/p bilateral mastectomies no evidence of recurrence.     stable    PATHOLOGY:    As per  HPI    IMAGING:  None    ASSESSMENT/PLAN:  Jazmin is a very pleasant 62-year-old female who has a history of invasive lobular carcinoma stage IIb ER/[place] positive HER-2/philippe negative currently on Aromasin    I discussed with the patient that in her situation being that she is lymph node positive and a lobular cancer with lymphovascular invasion my recommendation would be to continue 10 years of therapy therefore she will complete in 2026.               1.  Breast cancer: continue aromasin, labs stable  2 anxiety/depression  decrease lexapro to 5 mg   3.  Dental work: Continue as per her dentist.       4.  Bone health: Calcium 1200 mg a day,    Vitamin D 2000 IUs daily plus Osteo Bi-Flex.  DEXA is normal next one due in 2025    5.  Guillain-Barré syndrome: Chronic foot pain continue management per primary care physician.     6 weight gain: metformin 500 mg a day stopped it wasn't working. Weight management clinic   7 hypertension: improved,Continue norvasc 5 mg a day per primary care physician    See me in 6 months     Parker Richardson MD  Hematology/Oncology  [facility]

## 2024-07-17 NOTE — PROGRESS NOTES
"Oncology Rooming Note    July 17, 2024 11:41 AM   Jazmin Soler is a 64 year old female who presents for:    Chief Complaint   Patient presents with    Oncology Clinic Visit     Initial Vitals: /77   Pulse 79   Temp 98.1  F (36.7  C) (Oral)   Resp 14   Wt 104.4 kg (230 lb 3.2 oz)   LMP 12/22/2009   SpO2 96%   BMI 39.51 kg/m   Estimated body mass index is 39.51 kg/m  as calculated from the following:    Height as of 1/17/24: 1.626 m (5' 4\").    Weight as of this encounter: 104.4 kg (230 lb 3.2 oz). Body surface area is 2.17 meters squared.  No Pain (0) Comment: Data Unavailable   Patient's last menstrual period was 12/22/2009.  Allergies reviewed: Yes  Medications reviewed: Yes    Medications: Medication refills not needed today.  Pharmacy name entered into [facility]:    [id number] [place], [place] - [address] [place], [place] - [address]    Frailty Screening:   Is the patient here for a new oncology consult visit in cancer care? 2. No      Clinical concerns: test results, weight - Dr. Richardson informed.      Shari J. Schoenberger, [facility]            "

## 2024-07-17 NOTE — LETTER
"7/17/2024      Jazmin Soler  [address] [place] [address]      Dear Colleague,    Thank you for referring your patient, Jazmin Soler, to the [facility]. Please see a copy of my visit note below.    Oncology Rooming Note    July 17, 2024 11:41 AM   Jazmin Soler is a 64 year old female who presents for:    Chief Complaint   Patient presents with     Oncology Clinic Visit     Initial Vitals: /77   Pulse 79   Temp 98.1  F (36.7  C) (Oral)   Resp 14   Wt 104.4 kg (230 lb 3.2 oz)   LMP 12/22/2009   SpO2 96%   BMI 39.51 kg/m   Estimated body mass index is 39.51 kg/m  as calculated from the following:    Height as of 1/17/24: 1.626 m (5' 4\").    Weight as of this encounter: 104.4 kg (230 lb 3.2 oz). Body surface area is 2.17 meters squared.  No Pain (0) Comment: Data Unavailable   Patient's last menstrual period was 12/22/2009.  Allergies reviewed: Yes  Medications reviewed: Yes    Medications: Medication refills not needed today.  Pharmacy name entered into "[employer]:    [id number] [place] [place] [facility] [address] [address] [facility] [address] [address]    Frailty Screening:   Is the patient here for a new oncology consult visit in cancer care? 2. No      Clinical concerns: test results, weight - Dr. Richardson informed.      Shari J. Schoenberger, [facility]    Hematology/Oncology Return patient visit  video    Today's Date:July 17, 2024    Reason for Consult: Breast cancer     HISTORY OF PRESENT ILLNESS: Jazmin is a very pleasant 62-year-old patient of mine whom I been seeing for years.  She is transferring care from [place] oncology      1.  Diagnosed with breast cancer T2 invasive lobular carcinoma and invasive ductal carcinoma in May 2016  2.  Oncotype DX low risk of recurrence  3. Mastectomies final tumor was ER positive TN positive HER-2/philippe negative with evidence of lymphovascular invasion " 1 out of 2 sentinel lymph nodes were positive for carcinoma measuring 3 mm in size  4.  Total tumor size 4 cm, Oncotype DX score was 13  5.  BRCA testing negative  6.  Letrozole June 2016 to November 2020 changed to Aromasin due to arthralgias  7.  Bone density scan recently shows a T score of -1.2 baseline initially prior to starting was normal  8 Started Aromasin and tolerating it much better    Interval history Jazmin presents for follow-up today.   She is doing well. Frustrated with her weight. Metformin 500 mg bid did not work well for her, she is eating more stressed after  passing away    REVIEW OF SYSTEMS:   14 point ROS was reviewed and is negative other than as noted above in HPI.       HOME MEDICATIONS:  Current Outpatient Medications   Medication Sig Dispense Refill     amLODIPine (NORVASC) 5 MG tablet Take 5 mg by mouth daily       escitalopram (LEXAPRO) 10 MG tablet TAKE 1 TABLET (10 MG) BY MOUTH DAILY. 90 tablet 3     exemestane (AROMASIN) 25 MG tablet TAKE 1 TABLET BY MOUTH EVERY DAY 90 tablet 3     ibuprofen (ADVIL,MOTRIN) 600 MG tablet Take 1 tablet (600 mg) by mouth every 6 hours as needed for pain (mild) 30 tablet 0     metFORMIN (GLUCOPHAGE) 500 MG tablet TAKE 1 TABLET BY MOUTH TWICE A DAY WITH MEALS 180 tablet 2     morphine (MS CONTIN) 15 MG CR tablet Take 15 mg by mouth       oxyCODONE HCl (OXECTA) 5 MG TABA Take 5 mg by mouth as needed       potassium chloride ER (KLOR-CON M) 20 MEQ CR tablet Take 1 tablet (20 mEq) by mouth in the morning and 1 tablet (20 mEq) in the evening. 180 tablet 1     rizatriptan (MAXALT) 10 MG tablet Take 10 mg by mouth at onset of headache May repeat in 2 hours if needed: max 2/day; average number of headaches monthly 1       triamterene-hydrochlorothiazide (MAXZIDE-25) 37.5-25 MG per tablet Take 1 tablet by mouth daily.       VITAMIN D, CHOLECALCIFEROL, PO Take  by mouth.       YUVAFEM 10 MCG TABS vaginal tablet            ALLERGIES:  Allergies   Allergen  Reactions     Influenza Virus Vaccine Other (See Comments)     Guillain [place] syndrome         PAST MEDICAL HISTORY:  Past Medical History:   Diagnosis Date     Anxiety      Breast cancer (H) 3/30/2016    Left breast     H/O Guillain-Mishawaka syndrome      Hypertension      Migraines      Peripheral neuropathy     Related to h/o Guillain-Mishawaka syndrome     S/P radiation therapy     6,040 cGy to left chest wall_axilla and 4,500 cGy to left SCV_axilla completed on 8/12/2016 [facility]         PAST SURGICAL HISTORY:  Past Surgical History:   Procedure Laterality Date     BILATERAL 1ST STAGE BREAST RECONSTRUCTION Bilateral 5/3/2016    Dr. Marry Aldridge     BIOPSY BREAST SEED LOCALIZATION Left 3/30/2016    Procedure: BIOPSY BREAST SEED LOCALIZATION;  Surgeon: Yue Smiley MD;  Location: [facility]     BREAST BIOPSY, CORE RT/LT Left 9/2/2015     BREAST BIOPSY, CORE RT/LT Left 3/23/2016     CHOLECYSTECTOMY  1998     MAMMOPLASTY REDUCTION Bilateral 1989     MASTECTOMY SIMPLE BILATERAL Bilateral 5/3/2016    Dr. Smiley [facility]     OTHER - AXILLARY SENTINEL LYMPH NODE BIOPSY Left 5/3/2016     [facility]     TUBAL LIGATION  1998         SOCIAL HISTORY:  Social History     Socioeconomic History     Marital status:      Spouse name: Not on file     Number of children: Not on file     Years of education: Not on file     Highest education level: Not on file   Occupational History     Not on file   Tobacco Use     Smoking status: Never     Smokeless tobacco: Never   Substance and Sexual Activity     Alcohol use: No     Alcohol/week: 0.0 standard drinks of alcohol     Drug use: No     Sexual activity: Yes     Partners: Male     Birth control/protection: Female Surgical     Comment: Tubal ligation 1998   Other Topics Concern     Parent/sibling w/ CABG, MI or angioplasty before 65F 55M? Not Asked   Social History Narrative     Not on file     Social Determinants of Health      Financial Resource Strain: Not on file   Food Insecurity: Not on file   Transportation Needs: Not on file   Physical Activity: Not on file   Stress: Not on file   Social Connections: Not on file   Interpersonal Safety: Not on file   Housing Stability: Not on file         FAMILY HISTORY:  Family History   Problem Relation Age of Onset     Cancer Mother 65        Breast, living     Cancer Father 55        Melanoma, living     Cardiovascular Maternal Grandmother         Heart attack     Cancer Maternal Grandfather 71        Gallbladder,      Unknown/Adopted Paternal Grandmother         old age      Cardiovascular Paternal Grandfather         heart attack     Cancer Brother 40        Testicular, living     Cancer Other         Breast,      Cancer Maternal Aunt 55        Breast, living (mother's twin sister)     Cancer Maternal Aunt 60        Breast, living     Cancer Other 55        Breast, living     Cancer Other         Breast,      Cancer Other         Breast,  (Daughter to above maternal great aunt)     Cancer Other         Breast,  (Daughter to above maternal great aunt)         PHYSICAL EXAM:  Vital signs:  /77   Pulse 79   Temp 98.1  F (36.7  C) (Oral)   Resp 14   Wt 104.4 kg (230 lb 3.2 oz)   LMP 2009   SpO2 96%   BMI 39.51 kg/m     ECO  S/p bilateral mastectomies no evidence of recurrence.     stable    PATHOLOGY:    As per HPI    IMAGING:  None    ASSESSMENT/PLAN:  Jazmin is a very pleasant 62-year-old female who has a history of invasive lobular carcinoma stage IIb ER/[place] positive HER-2/philippe negative currently on Aromasin    I discussed with the patient that in her situation being that she is lymph node positive and a lobular cancer with lymphovascular invasion my recommendation would be to continue 10 years of therapy therefore she will complete in .               1.  Breast cancer: continue aromasin, labs stable  2 anxiety/depression   decrease lexapro to 5 mg   3.  Dental work: Continue as per her dentist.       4.  Bone health: Calcium 1200 mg a day,    Vitamin D 2000 IUs daily plus Osteo Bi-Flex.  DEXA is normal next one due in 2025    5.  Guillain-Barré syndrome: Chronic foot pain continue management per primary care physician.     6 weight gain: metformin 500 mg a day stopped it wasn't working. Weight management clinic   7 hypertension: improved,Continue norvasc 5 mg a day per primary care physician    See me in 6 months     Parker Richardson MD  Hematology/Oncology  [facility]    Lexapro 5 mgt      Again, thank you for allowing me to participate in the care of your patient.        Sincerely,        Parker Richardson MD

## 2024-08-04 ENCOUNTER — HEALTH MAINTENANCE LETTER (OUTPATIENT)
Age: 64
End: 2024-08-04

## 2024-09-18 ENCOUNTER — PATIENT OUTREACH (OUTPATIENT)
Dept: ONCOLOGY | Facility: CLINIC | Age: 64
End: 2024-09-18
Payer: MEDICARE

## 2024-09-18 NOTE — PROGRESS NOTES
Writer received a call from patient that she is struggling with weight loss and would like to try Metformin extended release.      She is requesting a script to be sent [facility] in [place].     Message routed.     Karla Stringer RN    Called hermila, she had been on metformin prior and has requested to try it again. Script has been sent to her [facility] in [place] per patient request. Belen Valenzuela RN,BSN,OCN,CBCN

## 2024-09-19 DIAGNOSIS — R73.09 ELEVATED GLUCOSE LEVEL: Primary | ICD-10-CM

## 2024-09-19 RX ORDER — METFORMIN HCL 500 MG
500 TABLET, EXTENDED RELEASE 24 HR ORAL 2 TIMES DAILY WITH MEALS
Qty: 60 TABLET | Refills: 3 | Status: SHIPPED | OUTPATIENT
Start: 2024-09-19

## 2025-01-22 ENCOUNTER — LAB (OUTPATIENT)
Dept: LAB | Facility: CLINIC | Age: 65
End: 2025-01-22
Payer: COMMERCIAL

## 2025-01-22 DIAGNOSIS — Z17.0 MALIGNANT NEOPLASM OF LOWER-OUTER QUADRANT OF RIGHT BREAST OF FEMALE, ESTROGEN RECEPTOR POSITIVE (H): ICD-10-CM

## 2025-01-22 DIAGNOSIS — E66.813 CLASS 3 SEVERE OBESITY WITH BODY MASS INDEX (BMI) OF 45.0 TO 49.9 IN ADULT, UNSPECIFIED OBESITY TYPE, UNSPECIFIED WHETHER SERIOUS COMORBIDITY PRESENT (H): ICD-10-CM

## 2025-01-22 DIAGNOSIS — E66.01 CLASS 3 SEVERE OBESITY WITH BODY MASS INDEX (BMI) OF 45.0 TO 49.9 IN ADULT, UNSPECIFIED OBESITY TYPE, UNSPECIFIED WHETHER SERIOUS COMORBIDITY PRESENT (H): ICD-10-CM

## 2025-01-22 DIAGNOSIS — C50.511 MALIGNANT NEOPLASM OF LOWER-OUTER QUADRANT OF RIGHT BREAST OF FEMALE, ESTROGEN RECEPTOR POSITIVE (H): ICD-10-CM

## 2025-01-22 LAB
ALBUMIN SERPL BCG-MCNC: 4.4 G/DL (ref 3.5–5.2)
ALP SERPL-CCNC: 131 U/L (ref 40–150)
ALT SERPL W P-5'-P-CCNC: 35 U/L (ref 0–50)
ANION GAP SERPL CALCULATED.3IONS-SCNC: 11 MMOL/L (ref 7–15)
AST SERPL W P-5'-P-CCNC: 25 U/L (ref 0–45)
BASOPHILS # BLD AUTO: 0.1 10E3/UL (ref 0–0.2)
BASOPHILS NFR BLD AUTO: 1 %
BILIRUB SERPL-MCNC: 1.1 MG/DL
BUN SERPL-MCNC: 17.7 MG/DL (ref 8–23)
CALCIUM SERPL-MCNC: 9.7 MG/DL (ref 8.8–10.4)
CHLORIDE SERPL-SCNC: 99 MMOL/L (ref 98–107)
CREAT SERPL-MCNC: 0.92 MG/DL (ref 0.51–0.95)
EGFRCR SERPLBLD CKD-EPI 2021: 68 ML/MIN/1.73M2
EOSINOPHIL # BLD AUTO: 0.3 10E3/UL (ref 0–0.7)
EOSINOPHIL NFR BLD AUTO: 4 %
ERYTHROCYTE [DISTWIDTH] IN BLOOD BY AUTOMATED COUNT: 12.8 % (ref 10–15)
GLUCOSE SERPL-MCNC: 162 MG/DL (ref 70–99)
HCO3 SERPL-SCNC: 30 MMOL/L (ref 22–29)
HCT VFR BLD AUTO: 44.5 % (ref 35–47)
HGB BLD-MCNC: 14.7 G/DL (ref 11.7–15.7)
IMM GRANULOCYTES # BLD: 0 10E3/UL
IMM GRANULOCYTES NFR BLD: 0 %
LYMPHOCYTES # BLD AUTO: 2.2 10E3/UL (ref 0.8–5.3)
LYMPHOCYTES NFR BLD AUTO: 31 %
MCH RBC QN AUTO: 28.7 PG (ref 26.5–33)
MCHC RBC AUTO-ENTMCNC: 33 G/DL (ref 31.5–36.5)
MCV RBC AUTO: 87 FL (ref 78–100)
MONOCYTES # BLD AUTO: 0.5 10E3/UL (ref 0–1.3)
MONOCYTES NFR BLD AUTO: 7 %
NEUTROPHILS # BLD AUTO: 4 10E3/UL (ref 1.6–8.3)
NEUTROPHILS NFR BLD AUTO: 58 %
NRBC # BLD AUTO: 0 10E3/UL
NRBC BLD AUTO-RTO: 0 /100
PLATELET # BLD AUTO: 235 10E3/UL (ref 150–450)
POTASSIUM SERPL-SCNC: 3.7 MMOL/L (ref 3.4–5.3)
PROT SERPL-MCNC: 7 G/DL (ref 6.4–8.3)
RBC # BLD AUTO: 5.12 10E6/UL (ref 3.8–5.2)
SODIUM SERPL-SCNC: 140 MMOL/L (ref 135–145)
WBC # BLD AUTO: 7 10E3/UL (ref 4–11)

## 2025-01-22 PROCEDURE — 82378 CARCINOEMBRYONIC ANTIGEN: CPT

## 2025-01-22 PROCEDURE — 80053 COMPREHEN METABOLIC PANEL: CPT

## 2025-01-22 PROCEDURE — 36415 COLL VENOUS BLD VENIPUNCTURE: CPT

## 2025-01-22 PROCEDURE — 85025 COMPLETE CBC W/AUTO DIFF WBC: CPT

## 2025-01-22 PROCEDURE — 86300 IMMUNOASSAY TUMOR CA 15-3: CPT

## 2025-01-23 LAB
CANCER AG15-3 SERPL-ACNC: 31 U/ML
CEA SERPL-MCNC: 1.5 NG/ML

## 2025-01-27 ENCOUNTER — ONCOLOGY VISIT (OUTPATIENT)
Dept: ONCOLOGY | Facility: CLINIC | Age: 65
End: 2025-01-27
Attending: INTERNAL MEDICINE
Payer: MEDICARE

## 2025-01-27 VITALS
WEIGHT: 221 LBS | SYSTOLIC BLOOD PRESSURE: 148 MMHG | HEART RATE: 79 BPM | DIASTOLIC BLOOD PRESSURE: 84 MMHG | RESPIRATION RATE: 16 BRPM | BODY MASS INDEX: 37.93 KG/M2 | OXYGEN SATURATION: 98 %

## 2025-01-27 DIAGNOSIS — C50.511 MALIGNANT NEOPLASM OF LOWER-OUTER QUADRANT OF RIGHT BREAST OF FEMALE, ESTROGEN RECEPTOR POSITIVE (H): ICD-10-CM

## 2025-01-27 DIAGNOSIS — E66.813 CLASS 3 SEVERE OBESITY WITH BODY MASS INDEX (BMI) OF 45.0 TO 49.9 IN ADULT, UNSPECIFIED OBESITY TYPE, UNSPECIFIED WHETHER SERIOUS COMORBIDITY PRESENT (H): Primary | ICD-10-CM

## 2025-01-27 DIAGNOSIS — Z17.0 MALIGNANT NEOPLASM OF LOWER-OUTER QUADRANT OF RIGHT BREAST OF FEMALE, ESTROGEN RECEPTOR POSITIVE (H): ICD-10-CM

## 2025-01-27 DIAGNOSIS — Z79.811 LONG TERM (CURRENT) USE OF AROMATASE INHIBITORS: ICD-10-CM

## 2025-01-27 DIAGNOSIS — E66.01 CLASS 3 SEVERE OBESITY WITH BODY MASS INDEX (BMI) OF 45.0 TO 49.9 IN ADULT, UNSPECIFIED OBESITY TYPE, UNSPECIFIED WHETHER SERIOUS COMORBIDITY PRESENT (H): Primary | ICD-10-CM

## 2025-01-27 PROCEDURE — G2211 COMPLEX E/M VISIT ADD ON: HCPCS | Performed by: INTERNAL MEDICINE

## 2025-01-27 PROCEDURE — 99214 OFFICE O/P EST MOD 30 MIN: CPT | Performed by: INTERNAL MEDICINE

## 2025-01-27 PROCEDURE — G0463 HOSPITAL OUTPT CLINIC VISIT: HCPCS | Performed by: INTERNAL MEDICINE

## 2025-01-27 ASSESSMENT — PAIN SCALES - GENERAL: PAINLEVEL_OUTOF10: NO PAIN (0)

## 2025-01-27 NOTE — LETTER
"1/27/2025      Jazmin Soler  [address] [place] [address]      Dear Colleague,    Thank you for referring your patient, Jazmin Soler, to the [facility]. Please see a copy of my visit note below.    Oncology Rooming Note    January 27, 2025 12:24 PM   Jazmin Soler is a 64 year old female who presents for:    Chief Complaint   Patient presents with     Oncology Clinic Visit     Initial Vitals: BP (!) 148/84 (BP Location: Right arm, Patient Position: Sitting, Cuff Size: Adult Large)   Pulse 79   Resp 16   Wt 100.2 kg (221 lb)   LMP 12/22/2009   SpO2 98%   BMI 37.93 kg/m   Estimated body mass index is 37.93 kg/m  as calculated from the following:    Height as of 1/17/24: 1.626 m (5' 4\").    Weight as of this encounter: 100.2 kg (221 lb). Body surface area is 2.13 meters squared.  No Pain (0) Comment: Data Unavailable   Patient's last menstrual period was 12/22/2009.  Allergies reviewed: Yes  Medications reviewed: Yes    Medications: Medication refills not needed today.  Pharmacy name entered into [employer]:    [id number] [place] [facility] [address] [facility] [address] [address]    Frailty Screening:   Is the patient here for a new oncology consult visit in cancer care? 2. No      Clinical concerns: None       Nessa Jules MA              [facility]    Hematology/Oncology Return patient visit  video    Today's Date:Jan 27, 2025  Reason for Consult: Breast cancer     HISTORY OF PRESENT ILLNESS: Jazmin is a very pleasant 62-year-old patient of mine whom I been seeing for years.  She is transferring care from [place] oncology      1.  Diagnosed with breast cancer T2 invasive lobular carcinoma and invasive ductal carcinoma in May 2016  2.  Oncotype DX low risk of recurrence  3. Mastectomies final tumor was ER positive AR positive HER-2/philippe negative with evidence of lymphovascular invasion 1 out of 2 " sentinel lymph nodes were positive for carcinoma measuring 3 mm in size  4.  Total tumor size 4 cm, Oncotype DX score was 13  5.  BRCA testing negative  6.  Letrozole June 2016 to November 2020 changed to Aromasin due to arthralgias  7.  Bone density scan recently shows a T score of -1.2 baseline initially prior to starting was normal  8 Started Aromasin and tolerating it much better    Interval history Jazmin presents for follow-up today.  Unfortunately her mother passed away recently and she is grieving over that. She is going to work with weight loss clinic no new complaints   REVIEW OF SYSTEMS:   14 point ROS was reviewed and is negative other than as noted above in HPI.       HOME MEDICATIONS:  Current Outpatient Medications   Medication Sig Dispense Refill     amLODIPine (NORVASC) 5 MG tablet Take 5 mg by mouth daily       escitalopram (LEXAPRO) 10 MG tablet TAKE 1 TABLET (10 MG) BY MOUTH DAILY. 90 tablet 3     exemestane (AROMASIN) 25 MG tablet TAKE 1 TABLET BY MOUTH EVERY DAY 90 tablet 3     ibuprofen (ADVIL,MOTRIN) 600 MG tablet Take 1 tablet (600 mg) by mouth every 6 hours as needed for pain (mild) 30 tablet 0     metFORMIN (GLUCOPHAGE XR) 500 MG 24 hr tablet Take 1 tablet (500 mg) by mouth 2 times daily (with meals). 60 tablet 3     morphine (MS CONTIN) 15 MG CR tablet Take 15 mg by mouth       oxyCODONE HCl (OXECTA) 5 MG TABA Take 5 mg by mouth as needed       potassium chloride ER (KLOR-CON M) 20 MEQ CR tablet Take 1 tablet (20 mEq) by mouth in the morning and 1 tablet (20 mEq) in the evening. 180 tablet 1     rizatriptan (MAXALT) 10 MG tablet Take 10 mg by mouth at onset of headache May repeat in 2 hours if needed: max 2/day; average number of headaches monthly 1       triamterene-hydrochlorothiazide (MAXZIDE-25) 37.5-25 MG per tablet Take 1 tablet by mouth daily.       VITAMIN D, CHOLECALCIFEROL, PO Take  by mouth.       YUVAFEM 10 MCG TABS vaginal tablet            ALLERGIES:  Allergies   Allergen  Reactions     Influenza Virus Vaccine Other (See Comments)     Guillain Abell syndrome         PAST MEDICAL HISTORY:  Past Medical History:   Diagnosis Date     Anxiety      Breast cancer (H) 3/30/2016    Left breast     H/O Guillain-Abell syndrome      Hypertension      Migraines      Peripheral neuropathy     Related to h/o Guillain-Abell syndrome     S/P radiation therapy     6,040 cGy to left chest wall_axilla and 4,500 cGy to left SCV_axilla completed on 8/12/2016 [facility]         PAST SURGICAL HISTORY:  Past Surgical History:   Procedure Laterality Date     BILATERAL 1ST STAGE BREAST RECONSTRUCTION Bilateral 5/3/2016    Dr. Marry Aldridge     BIOPSY BREAST SEED LOCALIZATION Left 3/30/2016    Procedure: BIOPSY BREAST SEED LOCALIZATION;  Surgeon: Yue Smiley MD;  Location: [facility]     BREAST BIOPSY, CORE RT/LT Left 9/2/2015     BREAST BIOPSY, CORE RT/LT Left 3/23/2016     CHOLECYSTECTOMY  1998     MAMMOPLASTY REDUCTION Bilateral 1989     MASTECTOMY SIMPLE BILATERAL Bilateral 5/3/2016    Dr. Smiley [facility]     OTHER - AXILLARY SENTINEL LYMPH NODE BIOPSY Left 5/3/2016     [facility]     TUBAL LIGATION  1998         SOCIAL HISTORY:  Social History     Socioeconomic History     Marital status:      Spouse name: Not on file     Number of children: Not on file     Years of education: Not on file     Highest education level: Not on file   Occupational History     Not on file   Tobacco Use     Smoking status: Never     Smokeless tobacco: Never   Substance and Sexual Activity     Alcohol use: No     Alcohol/week: 0.0 standard drinks of alcohol     Drug use: No     Sexual activity: Yes     Partners: Male     Birth control/protection: Female Surgical     Comment: Tubal ligation 1998   Other Topics Concern     Parent/sibling w/ CABG, MI or angioplasty before 65F 55M? Not Asked   Social History Narrative     Not on file     Social Drivers of Health      Financial Resource Strain: Not on file   Food Insecurity: Not on file   Transportation Needs: Not on file   Physical Activity: Not on file   Stress: Not on file   Social Connections: Not on file   Interpersonal Safety: Not on file   Housing Stability: Not on file         FAMILY HISTORY:  Family History   Problem Relation Age of Onset     Cancer Mother 65        Breast, living     Cancer Father 55        Melanoma, living     Cardiovascular Maternal Grandmother         Heart attack     Cancer Maternal Grandfather 71        Gallbladder,      Unknown/Adopted Paternal Grandmother         old age      Cardiovascular Paternal Grandfather         heart attack     Cancer Brother 40        Testicular, living     Cancer Other         Breast,      Cancer Maternal Aunt 55        Breast, living (mother's twin sister)     Cancer Maternal Aunt 60        Breast, living     Cancer Other 55        Breast, living     Cancer Other         Breast,      Cancer Other         Breast,  (Daughter to above maternal great aunt)     Cancer Other         Breast,  (Daughter to above maternal great aunt)         PHYSICAL EXAM:  Vital signs:  BP (!) 148/84 (BP Location: Right arm, Patient Position: Sitting, Cuff Size: Adult Large)   Pulse 79   Resp 16   Wt 100.2 kg (221 lb)   LMP 2009   SpO2 98%   BMI 37.93 kg/m     ECO  S/p bilateral mastectomies no evidence of recurrence.     stable    PATHOLOGY:    As per HPI    IMAGING:  None    ASSESSMENT/PLAN:  Jazmin is a very pleasant 64 year-old female who has a history of invasive lobular carcinoma stage IIb ER/[place] positive HER-2/philippe negative currently on Aromasin    I discussed with the patient that in her situation being that she is lymph node positive and a lobular cancer with lymphovascular invasion my recommendation would be to continue 10 years of therapy therefore she will complete in .           1.  Breast cancer: continue aromasin,  labs stable   2 anxiety/depression  continue lexapro at 10  mg a day   3.  Dental work: Continue as per her dentist.       4.  Bone health: Calcium 1200 mg a day,    Vitamin D 2000 IUs daily plus Osteo Bi-Flex.  DEXA is normal next one due in 2025    5.  Guillain-Barré syndrome: Chronic foot pain continue management per primary care physician.     6 weight gain: metformin 500 mg a day stopped it wasn't working. Weight management clinic   7 hypertension: improved,Continue norvasc 5 mg a day per primary care physician  8 will be working with weight loss clinic     See me in 6 months     Parker Richardson MD  Hematology/Oncology  [facility]      Again, thank you for allowing me to participate in the care of your patient.        Sincerely,        Parker Richardson MD    Electronically signed

## 2025-01-27 NOTE — PROGRESS NOTES
"Oncology Rooming Note    January 27, 2025 12:24 PM   Jazmin Soler is a 64 year old female who presents for:    Chief Complaint   Patient presents with    Oncology Clinic Visit     Initial Vitals: BP (!) 148/84 (BP Location: Right arm, Patient Position: Sitting, Cuff Size: Adult Large)   Pulse 79   Resp 16   Wt 100.2 kg (221 lb)   LMP 12/22/2009   SpO2 98%   BMI 37.93 kg/m   Estimated body mass index is 37.93 kg/m  as calculated from the following:    Height as of 1/17/24: 1.626 m (5' 4\").    Weight as of this encounter: 100.2 kg (221 lb). Body surface area is 2.13 meters squared.  No Pain (0) Comment: Data Unavailable   Patient's last menstrual period was 12/22/2009.  Allergies reviewed: Yes  Medications reviewed: Yes    Medications: Medication refills not needed today.  Pharmacy [facility] entered into [email or web address]:    [id number] [place] [place] [facility] [address] [facility] [place] [place] [facility] [address] [address]    Frailty Screening:   Is the patient here for a new oncology consult visit in cancer care? 2. No      Clinical concerns: None       Nessa Jules MA            "

## 2025-01-27 NOTE — PROGRESS NOTES
[facility]    Hematology/Oncology Return patient visit  video    Today's Date:Jan 27, 2025  Reason for Consult: Breast cancer     HISTORY OF PRESENT ILLNESS: Jazmin is a very pleasant 62-year-old patient of mine whom I been seeing for years.  She is transferring care from [place] [facility]      1.  Diagnosed with breast cancer T2 invasive lobular carcinoma and invasive ductal carcinoma in May 2016  2.  Oncotype DX low risk of recurrence  3. Mastectomies final tumor was ER positive OR positive HER-2/philippe negative with evidence of lymphovascular invasion 1 out of 2 sentinel lymph nodes were positive for carcinoma measuring 3 mm in size  4.  Total tumor size 4 cm, Oncotype DX score was 13  5.  BRCA testing negative  6.  Letrozole June 2016 to November 2020 changed to Aromasin due to arthralgias  7.  Bone density scan recently shows a T score of -1.2 baseline initially prior to starting was normal  8 Started Aromasin and tolerating it much better    Interval history Jazmin presents for follow-up today.  Unfortunately her mother passed away recently and she is grieving over that. She is going to work with weight loss clinic no new complaints   REVIEW OF SYSTEMS:   14 point ROS was reviewed and is negative other than as noted above in HPI.       HOME MEDICATIONS:  Current Outpatient Medications   Medication Sig Dispense Refill    amLODIPine (NORVASC) 5 MG tablet Take 5 mg by mouth daily      escitalopram (LEXAPRO) 10 MG tablet TAKE 1 TABLET (10 MG) BY MOUTH DAILY. 90 tablet 3    exemestane (AROMASIN) 25 MG tablet TAKE 1 TABLET BY MOUTH EVERY DAY 90 tablet 3    ibuprofen (ADVIL,MOTRIN) 600 MG tablet Take 1 tablet (600 mg) by mouth every 6 hours as needed for pain (mild) 30 tablet 0    metFORMIN (GLUCOPHAGE XR) 500 MG 24 hr tablet Take 1 tablet (500 mg) by mouth 2 times daily (with meals). 60 tablet 3    morphine (MS CONTIN) 15 MG CR tablet Take 15 mg by mouth      oxyCODONE HCl (OXECTA) 5 MG TABA  Take 5 mg by mouth as needed      potassium chloride ER (KLOR-CON M) 20 MEQ CR tablet Take 1 tablet (20 mEq) by mouth in the morning and 1 tablet (20 mEq) in the evening. 180 tablet 1    rizatriptan (MAXALT) 10 MG tablet Take 10 mg by mouth at onset of headache May repeat in 2 hours if needed: max 2/day; average number of headaches monthly 1      triamterene-hydrochlorothiazide (MAXZIDE-25) 37.5-25 MG per tablet Take 1 tablet by mouth daily.      VITAMIN D, CHOLECALCIFEROL, PO Take  by mouth.      YUVAFEM 10 MCG TABS vaginal tablet            ALLERGIES:  Allergies   Allergen Reactions    Influenza Virus Vaccine Other (See Comments)     Guillain [place] syndrome         PAST MEDICAL HISTORY:  Past Medical History:   Diagnosis Date    Anxiety     Breast cancer (H) 3/30/2016    Left breast    H/O Guillain-[place] syndrome     Hypertension     Migraines     Peripheral neuropathy     Related to h/o Guillain-[place] syndrome    S/P radiation therapy     6,040 cGy to left chest wall_axilla and 4,500 cGy to left SCV_axilla completed on 8/12/2016 [facility]         PAST SURGICAL HISTORY:  Past Surgical History:   Procedure Laterality Date    BILATERAL 1ST STAGE BREAST RECONSTRUCTION Bilateral 5/3/2016    Dr. Marry Aldridge    BIOPSY BREAST SEED LOCALIZATION Left 3/30/2016    Procedure: BIOPSY BREAST SEED LOCALIZATION;  Surgeon: Yue Smiley MD;  Location: [facility]    BREAST BIOPSY, CORE RT/LT Left 9/2/2015    BREAST BIOPSY, CORE RT/LT Left 3/23/2016    CHOLECYSTECTOMY  1998    MAMMOPLASTY REDUCTION Bilateral 1989    MASTECTOMY SIMPLE BILATERAL Bilateral 5/3/2016     [facility]    OTHER - AXILLARY SENTINEL LYMPH NODE BIOPSY Left 5/3/2016     [facility]    TUBAL LIGATION  1998         SOCIAL HISTORY:  Social History     Socioeconomic History    Marital status:      Spouse name: Not on file    Number of children: Not on file    Years of education: Not on file     Highest education level: Not on file   Occupational History    Not on file   Tobacco Use    Smoking status: Never    Smokeless tobacco: Never   Substance and Sexual Activity    Alcohol use: No     Alcohol/week: 0.0 standard drinks of alcohol    Drug use: No    Sexual activity: Yes     Partners: Male     Birth control/protection: Female Surgical     Comment: Tubal ligation    Other Topics Concern    Parent/sibling w/ CABG, MI or angioplasty before 65F 55M? Not Asked   Social History Narrative    Not on file     Social Drivers of Health     Financial Resource Strain: Not on file   Food Insecurity: Not on file   Transportation Needs: Not on file   Physical Activity: Not on file   Stress: Not on file   Social Connections: Not on file   Interpersonal Safety: Not on file   Housing Stability: Not on file         FAMILY HISTORY:  Family History   Problem Relation Age of Onset    Cancer Mother 65        Breast, living    Cancer Father 55        Melanoma, living    Cardiovascular Maternal Grandmother         Heart attack    Cancer Maternal Grandfather 71        Gallbladder,     Unknown/Adopted Paternal Grandmother         old age     Cardiovascular Paternal Grandfather         heart attack    Cancer Brother 40        Testicular, living    Cancer Other         Breast,     Cancer Maternal Aunt 55        Breast, living (mother's twin sister)    Cancer Maternal Aunt 60        Breast, living    Cancer Other 55        Breast, living    Cancer Other         Breast,     Cancer Other         Breast,  (Daughter to above maternal great aunt)    Cancer Other         Breast,  (Daughter to above maternal great aunt)         PHYSICAL EXAM:  Vital signs:  BP (!) 148/84 (BP Location: Right arm, Patient Position: Sitting, Cuff Size: Adult Large)   Pulse 79   Resp 16   Wt 100.2 kg (221 lb)   LMP 2009   SpO2 98%   BMI 37.93 kg/m     ECO  S/p bilateral mastectomies no evidence of  recurrence.     stable    PATHOLOGY:    As per HPI    IMAGING:  None    ASSESSMENT/PLAN:  Jazmin is a very pleasant 64 year-old female who has a history of invasive lobular carcinoma stage IIb ER/IA positive HER-2/philippe negative currently on Aromasin    I discussed with the patient that in her situation being that she is lymph node positive and a lobular cancer with lymphovascular invasion my recommendation would be to continue 10 years of therapy therefore she will complete in 2026.           1.  Breast cancer: continue aromasin, labs stable   2 anxiety/depression  continue lexapro at 10  mg a day   3.  Dental work: Continue as per her dentist.       4.  Bone health: Calcium 1200 mg a day,    Vitamin D 2000 IUs daily plus Osteo Bi-Flex.  DEXA is normal next one due in 2025    5.  Guillain-Barré syndrome: Chronic foot pain continue management per primary care physician.     6 weight gain: metformin 500 mg a day stopped it wasn't working. Weight management clinic   7 hypertension: improved,Continue norvasc 5 mg a day per primary care physician  8 will be working with weight loss clinic     See me in 6 months     Parker Richardson MD  Hematology/Oncology  [facility]

## 2025-02-28 DIAGNOSIS — C50.919 BREAST CANCER (H): ICD-10-CM

## 2025-02-28 DIAGNOSIS — F41.9 ANXIETY: ICD-10-CM

## 2025-03-03 RX ORDER — ESCITALOPRAM OXALATE 10 MG/1
10 TABLET ORAL DAILY
Qty: 90 TABLET | Refills: 3 | Status: SHIPPED | OUTPATIENT
Start: 2025-03-03

## 2025-03-03 RX ORDER — EXEMESTANE 25 MG/1
TABLET ORAL
Qty: 90 TABLET | Refills: 3 | Status: SHIPPED | OUTPATIENT
Start: 2025-03-03

## 2025-05-12 ENCOUNTER — TELEPHONE (OUTPATIENT)
Dept: ONCOLOGY | Facility: CLINIC | Age: 65
End: 2025-05-12
Payer: MEDICARE

## 2025-05-12 NOTE — TELEPHONE ENCOUNTER
Called Jazmin left message on her voicemail for her to call clinic back regarding recommendation from Dr. Richardson regarding her recent CT scan abd/pelvis which was done at [facility]. Belen Valenzuela RN,BSN,OCN,CBCN      5/13/25 Called Jazmin regarding her recent CT abd/pelvis  which showed multiple small hypoattenuating lesions throughout the liver. Review with Dr. Richardson as this is not a new finding per her previous imaging exams. Per Dr. Richardson Jazmin does not need any immediate follow up. She is scheduled to see Dr. Richardson in early August 2025. Belen Valenzuela RN,BSN,OCN,CBCN

## 2025-06-30 ENCOUNTER — ANCILLARY PROCEDURE (OUTPATIENT)
Dept: BONE DENSITY | Facility: CLINIC | Age: 65
End: 2025-06-30
Attending: INTERNAL MEDICINE
Payer: MEDICARE

## 2025-06-30 DIAGNOSIS — C50.511 MALIGNANT NEOPLASM OF LOWER-OUTER QUADRANT OF RIGHT BREAST OF FEMALE, ESTROGEN RECEPTOR POSITIVE (H): ICD-10-CM

## 2025-06-30 DIAGNOSIS — Z17.0 MALIGNANT NEOPLASM OF LOWER-OUTER QUADRANT OF RIGHT BREAST OF FEMALE, ESTROGEN RECEPTOR POSITIVE (H): ICD-10-CM

## 2025-06-30 DIAGNOSIS — E66.813 CLASS 3 SEVERE OBESITY WITH BODY MASS INDEX (BMI) OF 45.0 TO 49.9 IN ADULT, UNSPECIFIED OBESITY TYPE, UNSPECIFIED WHETHER SERIOUS COMORBIDITY PRESENT (H): ICD-10-CM

## 2025-06-30 DIAGNOSIS — Z79.811 LONG TERM (CURRENT) USE OF AROMATASE INHIBITORS: ICD-10-CM

## 2025-06-30 PROCEDURE — 77080 DXA BONE DENSITY AXIAL: CPT | Performed by: RADIOLOGY

## 2025-07-22 ENCOUNTER — LAB (OUTPATIENT)
Dept: LAB | Facility: CLINIC | Age: 65
End: 2025-07-22
Payer: COMMERCIAL

## 2025-07-22 DIAGNOSIS — C50.511 MALIGNANT NEOPLASM OF LOWER-OUTER QUADRANT OF RIGHT BREAST OF FEMALE, ESTROGEN RECEPTOR POSITIVE (H): ICD-10-CM

## 2025-07-22 DIAGNOSIS — E66.813 CLASS 3 SEVERE OBESITY WITH BODY MASS INDEX (BMI) OF 45.0 TO 49.9 IN ADULT, UNSPECIFIED OBESITY TYPE, UNSPECIFIED WHETHER SERIOUS COMORBIDITY PRESENT (H): ICD-10-CM

## 2025-07-22 DIAGNOSIS — Z17.0 MALIGNANT NEOPLASM OF LOWER-OUTER QUADRANT OF RIGHT BREAST OF FEMALE, ESTROGEN RECEPTOR POSITIVE (H): ICD-10-CM

## 2025-07-22 LAB
ALBUMIN SERPL BCG-MCNC: 4.3 G/DL (ref 3.5–5.2)
ALP SERPL-CCNC: 139 U/L (ref 40–150)
ALT SERPL W P-5'-P-CCNC: 39 U/L (ref 0–50)
ANION GAP SERPL CALCULATED.3IONS-SCNC: 12 MMOL/L (ref 7–15)
AST SERPL W P-5'-P-CCNC: 34 U/L (ref 0–45)
BASOPHILS # BLD AUTO: 0.1 10E3/UL (ref 0–0.2)
BASOPHILS NFR BLD AUTO: 1 %
BILIRUB SERPL-MCNC: 1.3 MG/DL
BUN SERPL-MCNC: 14.5 MG/DL (ref 8–23)
CALCIUM SERPL-MCNC: 9.9 MG/DL (ref 8.8–10.4)
CHLORIDE SERPL-SCNC: 103 MMOL/L (ref 98–107)
CREAT SERPL-MCNC: 1.09 MG/DL (ref 0.51–0.95)
EGFRCR SERPLBLD CKD-EPI 2021: 55 ML/MIN/1.73M2
EOSINOPHIL # BLD AUTO: 0.2 10E3/UL (ref 0–0.7)
EOSINOPHIL NFR BLD AUTO: 3 %
ERYTHROCYTE [DISTWIDTH] IN BLOOD BY AUTOMATED COUNT: 13.2 % (ref 10–15)
GLUCOSE SERPL-MCNC: 119 MG/DL (ref 70–99)
HCO3 SERPL-SCNC: 26 MMOL/L (ref 22–29)
HCT VFR BLD AUTO: 42.8 % (ref 35–47)
HGB BLD-MCNC: 14.5 G/DL (ref 11.7–15.7)
IMM GRANULOCYTES # BLD: 0 10E3/UL
IMM GRANULOCYTES NFR BLD: 0 %
LYMPHOCYTES # BLD AUTO: 2.4 10E3/UL (ref 0.8–5.3)
LYMPHOCYTES NFR BLD AUTO: 31 %
MCH RBC QN AUTO: 29.4 PG (ref 26.5–33)
MCHC RBC AUTO-ENTMCNC: 33.9 G/DL (ref 31.5–36.5)
MCV RBC AUTO: 87 FL (ref 78–100)
MONOCYTES # BLD AUTO: 0.6 10E3/UL (ref 0–1.3)
MONOCYTES NFR BLD AUTO: 8 %
NEUTROPHILS # BLD AUTO: 4.5 10E3/UL (ref 1.6–8.3)
NEUTROPHILS NFR BLD AUTO: 58 %
NRBC # BLD AUTO: 0 10E3/UL
NRBC BLD AUTO-RTO: 0 /100
PLATELET # BLD AUTO: 244 10E3/UL (ref 150–450)
POTASSIUM SERPL-SCNC: 3.7 MMOL/L (ref 3.4–5.3)
PROT SERPL-MCNC: 7.1 G/DL (ref 6.4–8.3)
RBC # BLD AUTO: 4.93 10E6/UL (ref 3.8–5.2)
SODIUM SERPL-SCNC: 141 MMOL/L (ref 135–145)
WBC # BLD AUTO: 7.7 10E3/UL (ref 4–11)

## 2025-07-22 PROCEDURE — 36415 COLL VENOUS BLD VENIPUNCTURE: CPT

## 2025-07-22 PROCEDURE — 82378 CARCINOEMBRYONIC ANTIGEN: CPT

## 2025-07-22 PROCEDURE — 80053 COMPREHEN METABOLIC PANEL: CPT

## 2025-07-22 PROCEDURE — 85025 COMPLETE CBC W/AUTO DIFF WBC: CPT

## 2025-07-22 PROCEDURE — 86300 IMMUNOASSAY TUMOR CA 15-3: CPT

## 2025-07-23 LAB
CANCER AG15-3 SERPL-ACNC: 29 U/ML
CEA SERPL-MCNC: 1.5 NG/ML

## 2025-08-04 ENCOUNTER — ONCOLOGY VISIT (OUTPATIENT)
Dept: ONCOLOGY | Facility: CLINIC | Age: 65
End: 2025-08-04
Attending: INTERNAL MEDICINE
Payer: MEDICARE

## 2025-08-04 VITALS
RESPIRATION RATE: 16 BRPM | HEART RATE: 65 BPM | SYSTOLIC BLOOD PRESSURE: 123 MMHG | HEIGHT: 64 IN | BODY MASS INDEX: 34.31 KG/M2 | OXYGEN SATURATION: 99 % | DIASTOLIC BLOOD PRESSURE: 74 MMHG | WEIGHT: 201 LBS

## 2025-08-04 DIAGNOSIS — Z17.0 MALIGNANT NEOPLASM OF UPPER-INNER QUADRANT OF LEFT BREAST IN FEMALE, ESTROGEN RECEPTOR POSITIVE (H): Primary | ICD-10-CM

## 2025-08-04 DIAGNOSIS — C50.212 MALIGNANT NEOPLASM OF UPPER-INNER QUADRANT OF LEFT BREAST IN FEMALE, ESTROGEN RECEPTOR POSITIVE (H): Primary | ICD-10-CM

## 2025-08-04 PROCEDURE — 99214 OFFICE O/P EST MOD 30 MIN: CPT | Performed by: INTERNAL MEDICINE

## 2025-08-04 PROCEDURE — G0463 HOSPITAL OUTPT CLINIC VISIT: HCPCS | Performed by: INTERNAL MEDICINE

## 2025-08-04 PROCEDURE — G2211 COMPLEX E/M VISIT ADD ON: HCPCS | Performed by: INTERNAL MEDICINE

## 2025-08-04 RX ORDER — MIRTAZAPINE 7.5 MG/1
7.5 TABLET, FILM COATED ORAL AT BEDTIME
COMMUNITY

## 2025-08-04 ASSESSMENT — PAIN SCALES - GENERAL: PAINLEVEL_OUTOF10: MILD PAIN (2)

## 2025-08-16 ENCOUNTER — HEALTH MAINTENANCE LETTER (OUTPATIENT)
Age: 65
End: 2025-08-16